# Patient Record
Sex: FEMALE | Race: WHITE | NOT HISPANIC OR LATINO
[De-identification: names, ages, dates, MRNs, and addresses within clinical notes are randomized per-mention and may not be internally consistent; named-entity substitution may affect disease eponyms.]

---

## 2017-06-12 ENCOUNTER — APPOINTMENT (OUTPATIENT)
Dept: CARDIOLOGY | Facility: CLINIC | Age: 67
End: 2017-06-12

## 2017-06-12 ENCOUNTER — NON-APPOINTMENT (OUTPATIENT)
Age: 67
End: 2017-06-12

## 2017-06-12 VITALS
RESPIRATION RATE: 14 BRPM | DIASTOLIC BLOOD PRESSURE: 84 MMHG | BODY MASS INDEX: 25.75 KG/M2 | SYSTOLIC BLOOD PRESSURE: 142 MMHG | WEIGHT: 150 LBS | OXYGEN SATURATION: 97 % | HEART RATE: 70 BPM

## 2017-06-12 RX ORDER — MODAFINIL 100 MG/1
100 TABLET ORAL DAILY
Qty: 90 | Refills: 0 | Status: ACTIVE | COMMUNITY
Start: 2017-06-12

## 2017-06-12 RX ORDER — CLONAZEPAM 0.5 MG/1
0.5 TABLET ORAL
Qty: 30 | Refills: 0 | Status: ACTIVE | COMMUNITY
Start: 2017-06-12

## 2017-12-11 ENCOUNTER — APPOINTMENT (OUTPATIENT)
Dept: CARDIOLOGY | Facility: CLINIC | Age: 67
End: 2017-12-11
Payer: MEDICARE

## 2017-12-11 ENCOUNTER — NON-APPOINTMENT (OUTPATIENT)
Age: 67
End: 2017-12-11

## 2017-12-11 VITALS
SYSTOLIC BLOOD PRESSURE: 132 MMHG | WEIGHT: 156 LBS | HEART RATE: 72 BPM | BODY MASS INDEX: 26.63 KG/M2 | DIASTOLIC BLOOD PRESSURE: 76 MMHG | HEIGHT: 64 IN

## 2017-12-11 PROCEDURE — 93000 ELECTROCARDIOGRAM COMPLETE: CPT

## 2017-12-11 PROCEDURE — 99214 OFFICE O/P EST MOD 30 MIN: CPT

## 2018-02-15 ENCOUNTER — MEDICATION RENEWAL (OUTPATIENT)
Age: 68
End: 2018-02-15

## 2018-05-14 ENCOUNTER — APPOINTMENT (OUTPATIENT)
Dept: CARDIOLOGY | Facility: CLINIC | Age: 68
End: 2018-05-14
Payer: MEDICARE

## 2018-05-14 PROCEDURE — A9500: CPT

## 2018-05-14 PROCEDURE — 93015 CV STRESS TEST SUPVJ I&R: CPT

## 2018-05-14 PROCEDURE — 78452 HT MUSCLE IMAGE SPECT MULT: CPT

## 2018-06-11 ENCOUNTER — APPOINTMENT (OUTPATIENT)
Dept: CARDIOLOGY | Facility: CLINIC | Age: 68
End: 2018-06-11
Payer: MEDICARE

## 2018-06-11 VITALS
WEIGHT: 160 LBS | HEART RATE: 60 BPM | RESPIRATION RATE: 14 BRPM | BODY MASS INDEX: 27.31 KG/M2 | DIASTOLIC BLOOD PRESSURE: 78 MMHG | SYSTOLIC BLOOD PRESSURE: 140 MMHG | HEIGHT: 64 IN

## 2018-06-11 DIAGNOSIS — R73.9 HYPERGLYCEMIA, UNSPECIFIED: ICD-10-CM

## 2018-06-11 PROCEDURE — 93000 ELECTROCARDIOGRAM COMPLETE: CPT

## 2018-06-11 PROCEDURE — 99214 OFFICE O/P EST MOD 30 MIN: CPT

## 2018-12-10 ENCOUNTER — NON-APPOINTMENT (OUTPATIENT)
Age: 68
End: 2018-12-10

## 2018-12-10 ENCOUNTER — APPOINTMENT (OUTPATIENT)
Dept: CARDIOLOGY | Facility: CLINIC | Age: 68
End: 2018-12-10
Payer: MEDICARE

## 2018-12-10 VITALS
HEIGHT: 64 IN | DIASTOLIC BLOOD PRESSURE: 86 MMHG | HEART RATE: 68 BPM | BODY MASS INDEX: 27.14 KG/M2 | SYSTOLIC BLOOD PRESSURE: 125 MMHG | OXYGEN SATURATION: 97 % | WEIGHT: 159 LBS

## 2018-12-10 PROCEDURE — 93000 ELECTROCARDIOGRAM COMPLETE: CPT

## 2018-12-10 PROCEDURE — 99215 OFFICE O/P EST HI 40 MIN: CPT

## 2018-12-10 NOTE — REASON FOR VISIT
[FreeTextEntry1] : Lorrie Devlin returns today for a followup visit regarding coronary artery disease S/P CABG, history of inferior wall myocardial infarction, hyperlipidemia and RBBB.

## 2018-12-10 NOTE — DISCUSSION/SUMMARY
[FreeTextEntry1] : ASCAD S/P CABG, s/p inferior wall myocardial infarction; remains stable. \par \par Hyperlipidemia.  Reviewed blood work, which shows good control of lipid profile.\par \par HgbA1c in diabetic range.  Advised her to avoid sweets and to try to lose weight.  Gave her a copy of her blood work to also discuss with with Dr. Zapata.   She will continue on her customary low fat diet.\par \par Explained that, although Hgb level is normal, MCV is low; gave her Rx to check ferritin and Fe/TIBC.\par \par RBBB - unchanged. \par \par Sent in new prescriptions.\par \par She will be seeing a new GI MD, as Dr. Ward is now in [place].  I asked her to let me know how this goes; if necessary, could give her other names for GI care.\par \par She will return for a visit in 6 months.  Gave her Rx for lab to recheck blood prior to that O.V.

## 2018-12-10 NOTE — ASSESSMENT
[FreeTextEntry1] : ASCAD\par      S/P CABG on October 10, 2003 by Dr. Ogden, with LIMA to OM, MARY to LAD and SVG to R-PDA\par      Inferior wall myocardial infarction in 1989\par \par Fatigue, cause unclear. Holding statin and reducing beta blocker had no benefit.\par \par Hyperlipidemia\par \par Very mild mitral and tricuspid valve insufficiency.\par \par Gastroesophageal reflux disease

## 2018-12-10 NOTE — HISTORY OF PRESENT ILLNESS
[FreeTextEntry1] : I saw her last in June.  Since that time, she continues her activities without cardiac sxs.  She describes no episodes of exertional no exertional chest discomfort or dyspnea. She reports no palpitations or episodes of lightheadedness. She reports no orthopnea or PND.\par \par She still suffers from a sense of increased fatigue, and currently uses generic Provigil on a prn basis.  Despite this, she remains active.

## 2019-01-14 ENCOUNTER — MEDICATION RENEWAL (OUTPATIENT)
Age: 69
End: 2019-01-14

## 2019-02-04 ENCOUNTER — APPOINTMENT (OUTPATIENT)
Dept: CARDIOLOGY | Facility: CLINIC | Age: 69
End: 2019-02-04
Payer: MEDICARE

## 2019-02-04 ENCOUNTER — NON-APPOINTMENT (OUTPATIENT)
Age: 69
End: 2019-02-04

## 2019-02-04 VITALS
DIASTOLIC BLOOD PRESSURE: 76 MMHG | WEIGHT: 159 LBS | BODY MASS INDEX: 27.14 KG/M2 | HEIGHT: 64 IN | OXYGEN SATURATION: 95 % | HEART RATE: 67 BPM | SYSTOLIC BLOOD PRESSURE: 116 MMHG

## 2019-02-04 DIAGNOSIS — Z01.810 ENCOUNTER FOR PREPROCEDURAL CARDIOVASCULAR EXAMINATION: ICD-10-CM

## 2019-02-04 PROCEDURE — 99215 OFFICE O/P EST HI 40 MIN: CPT

## 2019-02-04 PROCEDURE — 93000 ELECTROCARDIOGRAM COMPLETE: CPT

## 2019-02-04 NOTE — REASON FOR VISIT
[FreeTextEntry1] : Lorrie Devlin returns today for evaluation prior to EGD scheduled for later this month.  She has hx. of CAD S/P CABG, hx. of inferior wall myocardial infarction, hyperlipidemia and RBBB.

## 2019-02-04 NOTE — HISTORY OF PRESENT ILLNESS
[FreeTextEntry1] : I saw her last in December.  Since that time, she continues her usual activities and remains free of cardiac sxs.  There have been no episodes of exertional no exertional chest discomfort or dyspnea. She reports no palpitations or episodes of lightheadedness. She reports no orthopnea or PND.\par \par She is scheduled for EGD later this month.

## 2019-02-04 NOTE — ASSESSMENT
[FreeTextEntry1] : ASCAD\par      S/P CABG on October 10, 2003 by Dr. Ogden, with LIMA to OM, MARY to LAD and SVG to R-PDA\par      Inferior wall myocardial infarction in 1989\par \par Hyperlipidemia\par \par Very mild mitral and tricuspid valve insufficiency.\par \par Gastroesophageal reflux disease\par \par \par Fatigue, cause unclear. Holding statin and reducing beta blocker had no benefit.

## 2019-02-04 NOTE — DISCUSSION/SUMMARY
[FreeTextEntry1] : No cardiac contraindication to planned EGD.  Will send copy of today's note and EKG to Dr. Fran Burgos as well as copy of stress test from May 2018.\par \par ASCAD S/P CABG, s/p inferior wall myocardial infarction; remains stable.  Will continue current meds.\par \par Hyperlipidemia.  Continue Lipitor and Lovaza. \par \par RBBB - unchanged. \par \par HgbA1c in diabetic range.  Continue f/u with Dr. Zapata. \par \par She will return for a visit in the spring.

## 2019-06-10 ENCOUNTER — NON-APPOINTMENT (OUTPATIENT)
Age: 69
End: 2019-06-10

## 2019-06-10 ENCOUNTER — APPOINTMENT (OUTPATIENT)
Dept: CARDIOLOGY | Facility: CLINIC | Age: 69
End: 2019-06-10
Payer: MEDICARE

## 2019-06-10 VITALS
HEART RATE: 59 BPM | WEIGHT: 149 LBS | BODY MASS INDEX: 25.44 KG/M2 | OXYGEN SATURATION: 95 % | SYSTOLIC BLOOD PRESSURE: 124 MMHG | HEIGHT: 64 IN | DIASTOLIC BLOOD PRESSURE: 75 MMHG

## 2019-06-10 PROCEDURE — 93306 TTE W/DOPPLER COMPLETE: CPT

## 2019-06-10 PROCEDURE — 93000 ELECTROCARDIOGRAM COMPLETE: CPT

## 2019-06-10 PROCEDURE — 99214 OFFICE O/P EST MOD 30 MIN: CPT

## 2019-06-10 RX ORDER — AZELASTINE HYDROCHLORIDE 137 UG/1
0.1 SPRAY, METERED NASAL TWICE DAILY
Qty: 1 | Refills: 0 | Status: DISCONTINUED | COMMUNITY
Start: 2017-06-12 | End: 2019-06-10

## 2019-06-10 NOTE — REASON FOR VISIT
[FreeTextEntry1] : \par Lorrie Devlin returns today for f/u regarding CAD S/P CABG, hx. of inferior wall myocardial infarction, hyperlipidemia and RBBB.

## 2019-06-10 NOTE — DISCUSSION/SUMMARY
[FreeTextEntry1] : \par ASCAD S/P CABG, s/p inferior wall myocardial infarction; remains stable.  Will continue current meds.\par \par Hyperlipidemia.  Continue Lipitor and Lovaza. \par \par RBBB - unchanged. \par \par Review echo results with her; findings are stable.\par \par HgbA1c in diabetic range.  Continue f/u with Dr. Zapata. \par \par Has Rx to recheck labs, which she will do in next week or two\par \par She will return for a visit in six months.

## 2019-06-10 NOTE — PHYSICAL EXAM
[General Appearance - Well Developed] : well developed [General Appearance - Well Nourished] : well nourished [Normal Appearance] : normal appearance [Well Groomed] : well groomed [Normal Conjunctiva] : the conjunctiva exhibited no abnormalities [General Appearance - In No Acute Distress] : no acute distress [Eyelids - No Xanthelasma] : the eyelids demonstrated no xanthelasmas [Normal Jugular Venous A Waves Present] : normal jugular venous A waves present [Normal Jugular Venous V Waves Present] : normal jugular venous V waves present [Respiration, Rhythm And Depth] : normal respiratory rhythm and effort [Auscultation Breath Sounds / Voice Sounds] : lungs were clear to auscultation bilaterally [Heart Rate And Rhythm] : heart rate and rhythm were normal [Abnormal Walk] : normal gait [Bowel Sounds] : normal bowel sounds [Nail Clubbing] : no clubbing of the fingernails [Cyanosis, Localized] : no localized cyanosis [] : no rash [Skin Color & Pigmentation] : normal skin color and pigmentation [Impaired Insight] : insight and judgment were intact [Affect] : the affect was normal [Oriented To Time, Place, And Person] : oriented to person, place, and time [Mood] : the mood was normal [FreeTextEntry1] : 2+ pulses in the upper and lower extremities. No edema.

## 2019-06-10 NOTE — HISTORY OF PRESENT ILLNESS
[FreeTextEntry1] : I saw her last in February.  Since that time, she has been stable from a cardiac standpoint.  She reports  cardiac sxs. - there have been no episodes of exertional no exertional chest discomfort or dyspnea. She reports no palpitations or episodes of lightheadedness. She reports no orthopnea or PND.\par \par She is seeing a new GI MD affiliated with [facility], Dr. Burgos.  She had a recent EGD due to flare up of upper GI sxs., and remains on Prilosec.

## 2019-10-09 ENCOUNTER — RX RENEWAL (OUTPATIENT)
Age: 69
End: 2019-10-09

## 2019-12-10 ENCOUNTER — APPOINTMENT (OUTPATIENT)
Dept: CARDIOLOGY | Facility: CLINIC | Age: 69
End: 2019-12-10
Payer: MEDICARE

## 2019-12-10 ENCOUNTER — NON-APPOINTMENT (OUTPATIENT)
Age: 69
End: 2019-12-10

## 2019-12-10 VITALS
DIASTOLIC BLOOD PRESSURE: 81 MMHG | TEMPERATURE: 98.1 F | BODY MASS INDEX: 26.98 KG/M2 | OXYGEN SATURATION: 95 % | HEART RATE: 71 BPM | WEIGHT: 158 LBS | RESPIRATION RATE: 17 BRPM | SYSTOLIC BLOOD PRESSURE: 133 MMHG | HEIGHT: 64 IN

## 2019-12-10 PROCEDURE — 99214 OFFICE O/P EST MOD 30 MIN: CPT

## 2019-12-10 PROCEDURE — 93000 ELECTROCARDIOGRAM COMPLETE: CPT

## 2019-12-10 NOTE — HISTORY OF PRESENT ILLNESS
[FreeTextEntry1] : I saw her last in June.  Since that time, she has been well.  She remains free of cardiac sxs.; she reports no episodes of exertional no exertional chest discomfort or dyspnea. She reports no palpitations or episodes of lightheadedness. She reports no orthopnea or PND.\par \par She reports no new interval medical problems.  She continues to follow-up with Dr. Zapata for primary care as well as Dr. Burgos, a gastroenterologist is affiliated with [facility].

## 2019-12-10 NOTE — DISCUSSION/SUMMARY
[FreeTextEntry1] : \par ASCAD S/P CABG, s/p inferior wall myocardial infarction; remains stable.  Will continue current meds.\par \par Hyperlipidemia - Continue Lipitor and Lovaza; lipid profile remains in good range.\par \par RBBB - unchanged. \par \par HgbA1c in pre-diabetic range; encouraged her to be more careful with her diet and to try to lose weight. \par \par Provided Rx to recheck labs prior to next O.V.\par \par She will return for a visit in six months.

## 2019-12-10 NOTE — PHYSICAL EXAM
[General Appearance - Well Developed] : well developed [Normal Appearance] : normal appearance [Well Groomed] : well groomed [General Appearance - Well Nourished] : well nourished [Normal Conjunctiva] : the conjunctiva exhibited no abnormalities [General Appearance - In No Acute Distress] : no acute distress [Eyelids - No Xanthelasma] : the eyelids demonstrated no xanthelasmas [Normal Jugular Venous A Waves Present] : normal jugular venous A waves present [Normal Jugular Venous V Waves Present] : normal jugular venous V waves present [Respiration, Rhythm And Depth] : normal respiratory rhythm and effort [Auscultation Breath Sounds / Voice Sounds] : lungs were clear to auscultation bilaterally [Heart Rate And Rhythm] : heart rate and rhythm were normal [Abnormal Walk] : normal gait [Bowel Sounds] : normal bowel sounds [Cyanosis, Localized] : no localized cyanosis [Nail Clubbing] : no clubbing of the fingernails [] : no rash [Skin Color & Pigmentation] : normal skin color and pigmentation [Affect] : the affect was normal [Impaired Insight] : insight and judgment were intact [Oriented To Time, Place, And Person] : oriented to person, place, and time [Mood] : the mood was normal [FreeTextEntry1] : 2+ pulses in the upper and lower extremities. No edema.

## 2020-02-17 ENCOUNTER — RX RENEWAL (OUTPATIENT)
Age: 70
End: 2020-02-17

## 2020-10-28 ENCOUNTER — RX RENEWAL (OUTPATIENT)
Age: 70
End: 2020-10-28

## 2020-11-09 ENCOUNTER — NON-APPOINTMENT (OUTPATIENT)
Age: 70
End: 2020-11-09

## 2020-11-09 ENCOUNTER — APPOINTMENT (OUTPATIENT)
Dept: CARDIOLOGY | Facility: CLINIC | Age: 70
End: 2020-11-09
Payer: MEDICARE

## 2020-11-09 ENCOUNTER — RX RENEWAL (OUTPATIENT)
Age: 70
End: 2020-11-09

## 2020-11-09 VITALS
TEMPERATURE: 96.7 F | WEIGHT: 165 LBS | RESPIRATION RATE: 14 BRPM | SYSTOLIC BLOOD PRESSURE: 152 MMHG | BODY MASS INDEX: 28.32 KG/M2 | HEART RATE: 71 BPM | DIASTOLIC BLOOD PRESSURE: 94 MMHG | OXYGEN SATURATION: 96 %

## 2020-11-09 PROCEDURE — 93000 ELECTROCARDIOGRAM COMPLETE: CPT

## 2020-11-09 PROCEDURE — 99214 OFFICE O/P EST MOD 30 MIN: CPT

## 2020-11-09 RX ORDER — MELOXICAM 15 MG/1
15 TABLET ORAL
Qty: 30 | Refills: 0 | Status: COMPLETED | COMMUNITY
Start: 2020-07-10

## 2020-11-09 RX ORDER — DICLOFENAC SODIUM 1% 10 MG/G
1 GEL TOPICAL
Qty: 100 | Refills: 0 | Status: COMPLETED | COMMUNITY
Start: 2020-07-10

## 2020-11-09 RX ORDER — CICLOPIROX 80 MG/ML
8 SOLUTION TOPICAL
Qty: 20 | Refills: 0 | Status: COMPLETED | COMMUNITY
Start: 2020-08-03

## 2020-11-09 NOTE — HISTORY OF PRESENT ILLNESS
[FreeTextEntry1] : I saw her last in December.  Since that time, she remains well from a cardiac standpoint.  She continues her usual activities, working in her yard, including mowing the lawn each week.  She recounts no episodes of exertional no exertional chest discomfort or dyspnea. She reports no palpitations or episodes of lightheadedness. 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  She tells me that when she when she stands in place, she has a tremor, which resolves with motion or activity.  She anticipates that she may be started on a medication to see if it helps her symptoms.\par \par She continues to follow-up with Dr. Zapaat for primary care as well as Dr. Burgos, a gastroenterologist is affiliated with [place].  She tells me the dose of omeprazole was reduced to 20 mg each day, and she is using Tums on a as needed basis for reflux symptoms.

## 2020-11-09 NOTE — DISCUSSION/SUMMARY
[FreeTextEntry1] : \par ASCAD S/P CABG, s/p inferior wall myocardial infarction; remains active and free of sxs.  She will continue current meds.\par \par Hyperlipidemia - Continue Lipitor and Lovaza; lipid profile remains in good range.\par \par RBBB - unchanged. \par \par HgbA1c creeping upward.  I advised her to avoid concentrated sweets and reminded here that weight loss would have a favorable effect on her blood sugar. \par \par Provided Rx to recheck labs prior to next O.V.\par \par She will return for a visit in six months.

## 2021-05-10 ENCOUNTER — APPOINTMENT (OUTPATIENT)
Dept: CARDIOLOGY | Facility: CLINIC | Age: 71
End: 2021-05-10
Payer: MEDICARE

## 2021-05-10 ENCOUNTER — NON-APPOINTMENT (OUTPATIENT)
Age: 71
End: 2021-05-10

## 2021-05-10 VITALS
HEIGHT: 64 IN | HEART RATE: 66 BPM | BODY MASS INDEX: 25.27 KG/M2 | WEIGHT: 148 LBS | DIASTOLIC BLOOD PRESSURE: 74 MMHG | OXYGEN SATURATION: 95 % | SYSTOLIC BLOOD PRESSURE: 122 MMHG

## 2021-05-10 VITALS — SYSTOLIC BLOOD PRESSURE: 114 MMHG | RESPIRATION RATE: 14 BRPM | DIASTOLIC BLOOD PRESSURE: 68 MMHG

## 2021-05-10 DIAGNOSIS — I34.0 NONRHEUMATIC MITRAL (VALVE) INSUFFICIENCY: ICD-10-CM

## 2021-05-10 PROCEDURE — 99214 OFFICE O/P EST MOD 30 MIN: CPT

## 2021-05-10 PROCEDURE — 99072 ADDL SUPL MATRL&STAF TM PHE: CPT

## 2021-05-10 PROCEDURE — 93000 ELECTROCARDIOGRAM COMPLETE: CPT

## 2021-05-10 NOTE — DISCUSSION/SUMMARY
[FreeTextEntry1] : \par ASCAD S/P CABG, s/p inferior wall myocardial infarction.  Mrs. Devlin remains active and free of sxs.; she will continue current meds.\par \par HLD - Continue Lipitor and Lovaza; recent lab work shows lipid profile is in good range.\par \par RBBB - unchanged. \par \par HgbA1c improved, now doubt in response to weight loss.  She will continue to lose weight and avoid concentrated sweets. \par \par Will recheck labs prior to next O.V.  I provided a copy of the current labs to bring to Dr. Zapata.\par \par She will return for a visit in six months.

## 2021-05-10 NOTE — HISTORY OF PRESENT ILLNESS
[FreeTextEntry1] : I saw her last in November.  Since that time, she has been well from a cardiac standpoint.  She continues her usual activities, and now that spring has returned, she has been busy working in her yard.  She reports no episodes of exertional no exertional chest discomfort or GANDHI. There have been no palpitations or episodes of lightheadedness. She describes no orthopnea or PND.\par \par There have been no new interval medical problems. Medications are unchanged. She has received both Covid 19 vaccination in March (Pfizer).

## 2021-05-10 NOTE — ASSESSMENT
[FreeTextEntry1] : ASCAD\par      S/P CABG on October 10, 2003 by Dr. Ogden, with LIMA to OM, MARY to LAD and SVG to R-PDA\par      Inferior wall myocardial infarction in 1989\par \par Hyperlipidemia\par \par Very mild mitral and tricuspid valve insufficiency.\par \par Gastroesophageal reflux disease\par \par Fatigue, cause unclear. Holding statin and reducing beta blocker had no benefit.

## 2021-10-10 ENCOUNTER — RESULT CHARGE (OUTPATIENT)
Age: 71
End: 2021-10-10

## 2021-10-12 ENCOUNTER — APPOINTMENT (OUTPATIENT)
Dept: CARDIOLOGY | Facility: CLINIC | Age: 71
End: 2021-10-12

## 2021-10-13 NOTE — REASON FOR VISIT
[FreeTextEntry1] : NO SHOW FOR APPOINTMENT TODAY. \par \par \par PRELIMINARY NOTE\par \par \par Lorrie Hoamara returns today for f/u regarding CAD S/P CABG, hx. of inferior wall myocardial infarction, hyperlipidemia and RBBB.

## 2021-10-13 NOTE — HISTORY OF PRESENT ILLNESS
[FreeTextEntry1] : Since I saw her last,\par \par \par \par she continues her usual activities, and now that spring has returned, she has been busy working in her yard.  She reports no episodes of exertional no exertional chest discomfort or GANDHI. There have been no palpitations or episodes of lightheadedness. She describes no orthopnea or PND.\par \par There have been no new interval medical problems. Medications are unchanged. She has received both Covid 19 vaccination in March (Pfizer).

## 2021-10-20 ENCOUNTER — NON-APPOINTMENT (OUTPATIENT)
Age: 71
End: 2021-10-20

## 2021-10-20 ENCOUNTER — APPOINTMENT (OUTPATIENT)
Dept: CARDIOLOGY | Facility: CLINIC | Age: 71
End: 2021-10-20
Payer: MEDICARE

## 2021-10-20 VITALS
OXYGEN SATURATION: 98 % | DIASTOLIC BLOOD PRESSURE: 80 MMHG | WEIGHT: 154 LBS | BODY MASS INDEX: 26.29 KG/M2 | HEIGHT: 64 IN | RESPIRATION RATE: 15 BRPM | SYSTOLIC BLOOD PRESSURE: 138 MMHG | HEART RATE: 61 BPM

## 2021-10-20 PROCEDURE — 99215 OFFICE O/P EST HI 40 MIN: CPT

## 2021-10-20 PROCEDURE — 93000 ELECTROCARDIOGRAM COMPLETE: CPT

## 2021-10-20 RX ORDER — PERAMPANEL 8 MG/1
TABLET ORAL
Refills: 0 | Status: ACTIVE | COMMUNITY

## 2021-10-20 NOTE — HISTORY OF PRESENT ILLNESS
[FreeTextEntry1] : Since I saw her last, she continues her usual activities and remained well. She reports no cardiac sxs. - no  episodes of exertional no exertional chest discomfort or GANDHI. She reports  have been no palpitations or episodes of lightheadedness. She describes no orthopnea or PND.\par \par There reports no new interval medical problems other than the development of cataracts in both eyes with impairment of vision. \par \par Fycompa has been added to her meds to help with a lower extremity tremor, and she reports improvement after starting it.

## 2021-10-20 NOTE — DISCUSSION/SUMMARY
[FreeTextEntry1] : \par No cardiac contraindication to planned cataract surgery.\par \par ASCAD S/P CABG, s/p inferior wall myocardial infarction.  Mrs. Devlin remains active and free of sxs.; she will continue current meds.\par \par HLD - Continue Lipitor and Lovaza; most recent lab work showed lipid profile is in good range.  Provided lab requistion to repeat labs at her convenience in next few weeks.\par \par RBBB - unchanged. \par \par Mildly elevated HgbA1c in past - continue to avoid concentrated sweets, remain attentive to weight.. \par \par Will fax pertinent records to eye surgery center.\par \par 40 minutes spent on today's office visit.\par \par She will return for a visit in six months.

## 2021-10-20 NOTE — ASSESSMENT
[FreeTextEntry1] : ASCAD\par      S/P CABG on October 10, 2003 by Dr. Ogden, with LIMA to OM, MARY to LAD and SVG to R-PDA\par      Inferior wall myocardial infarction in 1989\par \par Hyperlipidemia\par \par Very mild mitral and tricuspid valve insufficiency.\par \par Gastroesophageal reflux disease\par \par Cataracts\par \par Fatigue, cause unclear. Holding statin and reducing beta blocker had no benefit.

## 2021-10-20 NOTE — REASON FOR VISIT
[FreeTextEntry1] : \par Gwendolyn Devlin returns today for f/u regarding CAD S/P CABG, hx. of inferior wall myocardial infarction, hyperlipidemia and RBBB.  She is scheduled for cataract surgery on Mon., Oct. 25.

## 2021-11-30 ENCOUNTER — NON-APPOINTMENT (OUTPATIENT)
Age: 71
End: 2021-11-30

## 2022-04-27 ENCOUNTER — NON-APPOINTMENT (OUTPATIENT)
Age: 72
End: 2022-04-27

## 2022-04-27 ENCOUNTER — APPOINTMENT (OUTPATIENT)
Dept: CARDIOLOGY | Facility: CLINIC | Age: 72
End: 2022-04-27
Payer: MEDICARE

## 2022-04-27 VITALS
RESPIRATION RATE: 15 BRPM | SYSTOLIC BLOOD PRESSURE: 132 MMHG | WEIGHT: 158 LBS | DIASTOLIC BLOOD PRESSURE: 80 MMHG | HEIGHT: 64 IN | HEART RATE: 71 BPM | BODY MASS INDEX: 26.98 KG/M2 | OXYGEN SATURATION: 96 %

## 2022-04-27 PROCEDURE — 93000 ELECTROCARDIOGRAM COMPLETE: CPT

## 2022-04-27 PROCEDURE — 99215 OFFICE O/P EST HI 40 MIN: CPT

## 2022-04-27 NOTE — REASON FOR VISIT
[FreeTextEntry1] : \par Gwendolyn Claus returns today for f/u regarding CAD S/P CABG, hx. of inferior wall myocardial infarction, hyperlipidemia and RBBB.

## 2022-04-27 NOTE — HISTORY OF PRESENT ILLNESS
[FreeTextEntry1] : Since I saw her last, she reports an increased sense of tiredness, most noticeable with exertion.  She does not describe GANDHI specifically, but clearly she needs to stop to rest before continuing.  She does  not describe CP.  She reports no palpitations or episodes of lightheadedness. There have been no episodes of orthopnea or PND.\par \par Medications are unchanged.

## 2022-04-27 NOTE — ASSESSMENT
[FreeTextEntry1] : ASCAD\par      S/P CABG on October 10, 2003 by Dr. Ogden, with LIMA to OM, MARY to LAD and SVG to R-PDA\par      Inferior wall myocardial infarction in 1989\par \par Hyperlipidemia\par \par RBBB\par \par Very mild mitral and tricuspid valve insufficiency.\par \par Gastroesophageal reflux disease\par \par Fatigue, cause unclear. Holding statin and reducing beta blocker had no benefit.

## 2022-04-27 NOTE — DISCUSSION/SUMMARY
[FreeTextEntry1] : ASCAD S/P CABG, s/p inferior wall myocardial infarction.  I am concerned that her current sense of fatigue, which is exertional and limits her activities, might be an anginal equivalent.  Her symptoms in the past had not been typical.  While the most recent stress test was unremarkable, prior to her bypass surgery, stress testing was not reliable and only angiography proved diagnostic.  As it has been 19 years since her CABG, I think it best to arrange for diagnostic angiogram, to reassess perfusion through her coronary arteries.  She is agreeable to this.  \par I will call the cath booking office to schedule this and will speak to Dr. Gerardo in advance of the procedure.\par \par HLD -She will continue Lipitor and Lovaza at the current dosage.  Recent lab work confirms that her lipid profile remains in good range.  I will provide a lab requisition that she can recheck her blood work prior to her next visit. \par \par RBBB - unchanged. \par \par Mildly elevated HgbA1c in past -reminded to avoid concentrated sweets, remain attentive to weight.. \par \par 40 minutes spent on today's office visit.  \par \par If angiography is unremarkable, she will return for a visit in six months.

## 2022-05-03 ENCOUNTER — TRANSCRIPTION ENCOUNTER (OUTPATIENT)
Age: 72
End: 2022-05-03

## 2022-05-03 ENCOUNTER — OUTPATIENT (OUTPATIENT)
Dept: OUTPATIENT SERVICES | Facility: HOSPITAL | Age: 72
LOS: 1 days | End: 2022-05-03
Payer: MEDICARE

## 2022-05-03 VITALS
OXYGEN SATURATION: 95 % | HEART RATE: 65 BPM | RESPIRATION RATE: 11 BRPM | SYSTOLIC BLOOD PRESSURE: 148 MMHG | DIASTOLIC BLOOD PRESSURE: 85 MMHG

## 2022-05-03 VITALS
OXYGEN SATURATION: 98 % | HEIGHT: 64 IN | SYSTOLIC BLOOD PRESSURE: 172 MMHG | TEMPERATURE: 98 F | DIASTOLIC BLOOD PRESSURE: 87 MMHG | HEART RATE: 74 BPM | WEIGHT: 152.12 LBS | RESPIRATION RATE: 15 BRPM

## 2022-05-03 DIAGNOSIS — Z98.890 OTHER SPECIFIED POSTPROCEDURAL STATES: Chronic | ICD-10-CM

## 2022-05-03 DIAGNOSIS — Z95.1 PRESENCE OF AORTOCORONARY BYPASS GRAFT: Chronic | ICD-10-CM

## 2022-05-03 DIAGNOSIS — I25.10 ATHEROSCLEROTIC HEART DISEASE OF NATIVE CORONARY ARTERY WITHOUT ANGINA PECTORIS: ICD-10-CM

## 2022-05-03 DIAGNOSIS — Z95.1 PRESENCE OF AORTOCORONARY BYPASS GRAFT: ICD-10-CM

## 2022-05-03 LAB
ANION GAP SERPL CALC-SCNC: 12 MMOL/L — SIGNIFICANT CHANGE UP (ref 5–17)
BUN SERPL-MCNC: 13 MG/DL — SIGNIFICANT CHANGE UP (ref 7–23)
CALCIUM SERPL-MCNC: 9.1 MG/DL — SIGNIFICANT CHANGE UP (ref 8.4–10.5)
CHLORIDE SERPL-SCNC: 106 MMOL/L — SIGNIFICANT CHANGE UP (ref 96–108)
CO2 SERPL-SCNC: 23 MMOL/L — SIGNIFICANT CHANGE UP (ref 22–31)
CREAT SERPL-MCNC: 0.59 MG/DL — SIGNIFICANT CHANGE UP (ref 0.5–1.3)
EGFR: 95 ML/MIN/1.73M2 — SIGNIFICANT CHANGE UP
GLUCOSE SERPL-MCNC: 113 MG/DL — HIGH (ref 70–99)
HCT VFR BLD CALC: 41.4 % — SIGNIFICANT CHANGE UP (ref 34.5–45)
HGB BLD-MCNC: 13 G/DL — SIGNIFICANT CHANGE UP (ref 11.5–15.5)
MCHC RBC-ENTMCNC: 27.9 PG — SIGNIFICANT CHANGE UP (ref 27–34)
MCHC RBC-ENTMCNC: 31.4 GM/DL — LOW (ref 32–36)
MCV RBC AUTO: 88.8 FL — SIGNIFICANT CHANGE UP (ref 80–100)
NRBC # BLD: 0 /100 WBCS — SIGNIFICANT CHANGE UP (ref 0–0)
PLATELET # BLD AUTO: 221 K/UL — SIGNIFICANT CHANGE UP (ref 150–400)
POTASSIUM SERPL-MCNC: 4.3 MMOL/L — SIGNIFICANT CHANGE UP (ref 3.5–5.3)
POTASSIUM SERPL-SCNC: 4.3 MMOL/L — SIGNIFICANT CHANGE UP (ref 3.5–5.3)
RBC # BLD: 4.66 M/UL — SIGNIFICANT CHANGE UP (ref 3.8–5.2)
RBC # FLD: 13.4 % — SIGNIFICANT CHANGE UP (ref 10.3–14.5)
SODIUM SERPL-SCNC: 141 MMOL/L — SIGNIFICANT CHANGE UP (ref 135–145)
WBC # BLD: 5.22 K/UL — SIGNIFICANT CHANGE UP (ref 3.8–10.5)
WBC # FLD AUTO: 5.22 K/UL — SIGNIFICANT CHANGE UP (ref 3.8–10.5)

## 2022-05-03 PROCEDURE — C1887: CPT

## 2022-05-03 PROCEDURE — 93459 L HRT ART/GRFT ANGIO: CPT | Mod: 26

## 2022-05-03 PROCEDURE — C1769: CPT

## 2022-05-03 PROCEDURE — 80048 BASIC METABOLIC PNL TOTAL CA: CPT

## 2022-05-03 PROCEDURE — 93459 L HRT ART/GRFT ANGIO: CPT

## 2022-05-03 PROCEDURE — 93010 ELECTROCARDIOGRAM REPORT: CPT

## 2022-05-03 PROCEDURE — 93005 ELECTROCARDIOGRAM TRACING: CPT

## 2022-05-03 PROCEDURE — 85027 COMPLETE CBC AUTOMATED: CPT

## 2022-05-03 PROCEDURE — C1894: CPT

## 2022-05-03 RX ORDER — PERAMPANEL 2 MG/1
1 TABLET ORAL
Qty: 0 | Refills: 0 | DISCHARGE

## 2022-05-03 RX ORDER — SODIUM CHLORIDE 9 MG/ML
1000 INJECTION INTRAMUSCULAR; INTRAVENOUS; SUBCUTANEOUS
Refills: 0 | Status: DISCONTINUED | OUTPATIENT
Start: 2022-05-03 | End: 2022-05-17

## 2022-05-03 RX ORDER — FOLIC ACID 0.8 MG
0 TABLET ORAL
Qty: 0 | Refills: 0 | DISCHARGE

## 2022-05-03 RX ORDER — SODIUM CHLORIDE 9 MG/ML
250 INJECTION INTRAMUSCULAR; INTRAVENOUS; SUBCUTANEOUS ONCE
Refills: 0 | Status: COMPLETED | OUTPATIENT
Start: 2022-05-03 | End: 2022-05-03

## 2022-05-03 RX ORDER — CLONAZEPAM 1 MG
1 TABLET ORAL
Qty: 0 | Refills: 0 | DISCHARGE

## 2022-05-03 RX ORDER — SERTRALINE 25 MG/1
1 TABLET, FILM COATED ORAL
Qty: 0 | Refills: 0 | DISCHARGE

## 2022-05-03 RX ORDER — PSEUDOEPHEDRINE HCL 30 MG
0 TABLET ORAL
Qty: 0 | Refills: 0 | DISCHARGE

## 2022-05-03 RX ORDER — OMEPRAZOLE 10 MG/1
1 CAPSULE, DELAYED RELEASE ORAL
Qty: 0 | Refills: 0 | DISCHARGE

## 2022-05-03 RX ORDER — METOPROLOL TARTRATE 50 MG
1 TABLET ORAL
Qty: 0 | Refills: 0 | DISCHARGE

## 2022-05-03 RX ORDER — OMEGA-3 ACID ETHYL ESTERS 1 G
0 CAPSULE ORAL
Qty: 0 | Refills: 0 | DISCHARGE

## 2022-05-03 RX ORDER — ASPIRIN/CALCIUM CARB/MAGNESIUM 324 MG
1 TABLET ORAL
Qty: 0 | Refills: 0 | DISCHARGE

## 2022-05-03 RX ORDER — ATORVASTATIN CALCIUM 80 MG/1
1 TABLET, FILM COATED ORAL
Qty: 0 | Refills: 0 | DISCHARGE

## 2022-05-03 RX ORDER — LORATADINE 10 MG/1
0 TABLET ORAL
Qty: 0 | Refills: 0 | DISCHARGE

## 2022-05-03 RX ADMIN — SODIUM CHLORIDE 75 MILLILITER(S): 9 INJECTION INTRAMUSCULAR; INTRAVENOUS; SUBCUTANEOUS at 12:08

## 2022-05-03 RX ADMIN — SODIUM CHLORIDE 500 MILLILITER(S): 9 INJECTION INTRAMUSCULAR; INTRAVENOUS; SUBCUTANEOUS at 12:08

## 2022-05-03 NOTE — ASU PATIENT PROFILE, ADULT - FALL HARM RISK - UNIVERSAL INTERVENTIONS
Bed in lowest position, wheels locked, appropriate side rails in place/Call bell, personal items and telephone in reach/Instruct patient to call for assistance before getting out of bed or chair/Non-slip footwear when patient is out of bed/[place] to call system/Physically safe environment - no spills, clutter or unnecessary equipment/Purposeful Proactive Rounding/Room/bathroom lighting operational, light cord in reach

## 2022-05-03 NOTE — H&P CARDIOLOGY - NSICDXFAMILYHX_GEN_ALL_CORE_FT
FAMILY HISTORY:  Father  Still living? Unknown  FH: heart disease, Age at diagnosis: Age Unknown    Mother  Still living? Unknown  FH: heart disease, Age at diagnosis: Age Unknown    Sibling  Still living? Unknown  FH: heart disease, Age at diagnosis: Age Unknown    Grandparent  Still living? Unknown  FH: heart disease, Age at diagnosis: Age Unknown    Aunt  Still living? Unknown  FH: heart disease, Age at diagnosis: Age Unknown    Uncle  Still living? Unknown  FH: heart disease, Age at diagnosis: Age Unknown

## 2022-05-03 NOTE — H&P CARDIOLOGY - HISTORY OF PRESENT ILLNESS
72 year old female with significant PMHx of CAD S/P CABG October 10, 2003 by Dr. Ogden, with LIMA to OM, MARY to LAD and SVG to R-PDA, Inferior wall myocardial infarction in 1989, hyperlipidemia and RBBB presents for [facility].  Patient reports an increased sense of tiredness, most noticeable with exertion and states that she needs to stop to rest before continuing, presumed anginal equivalent. Seen and evaluated by Dr. Santillan. Recent stress test was unremarkable, prior to her bypass surgery, stress testing was not reliable and only angiography proved diagnostic. Referred to Dr. Gerardo for further ischemic evaluation.     Patient denies palpitations or episodes of lightheadedness, orthopnea or PND. 72 year old female (denies cardiac implanted devices) with significant PMHx of CAD S/P CABG October 10, 2003 by Dr. Ogden, with LIMA to OM, MARY to LAD and SVG to R-PDA, Inferior wall myocardial infarction in 1989, hyperlipidemia, strong CAD family history and RBBB presents for [facility].  Patient reports an increased sense of tiredness, most noticeable with exertion and states that she needs to stop to rest before continuing, presumed anginal equivalent. Seen and evaluated by Dr. Santillan. Stress test was unremarkable, prior to her bypass surgery, stress testing was not reliable and only angiography proved diagnostic. Referred to Dr. Gerardo for further ischemic evaluation.     Patient denies palpitations or episodes of lightheadedness, orthopnea or PND.

## 2022-05-03 NOTE — H&P CARDIOLOGY - NSICDXPASTSURGICALHX_GEN_ALL_CORE_FT
PAST SURGICAL HISTORY:  S/P bunionectomy 1996    S/P CABG x 3 October 10, 2003 by Dr. Ogden, with LIMA to OM, MARY to LAD and SVG to R-PDA    S/P carpal tunnel release

## 2022-05-03 NOTE — ASU DISCHARGE PLAN (ADULT/PEDIATRIC) - NS MD DC FALL RISK RISK
For information on Fall & Injury Prevention, visit: [email or web address] OR  [email or web address] OR  https://www.cdc.gov/steadi/patient.html

## 2022-05-03 NOTE — H&P CARDIOLOGY - NSICDXPASTMEDICALHX_GEN_ALL_CORE_FT
PAST MEDICAL HISTORY:  Acute myocardial infarction     CAD (coronary artery disease)     HLD (hyperlipidemia)     RBBB      PAST MEDICAL HISTORY:  Acute myocardial infarction 1989    Arthritis     CAD (coronary artery disease)     HLD (hyperlipidemia)     Occasional tremors     RBBB

## 2022-05-03 NOTE — ASU DISCHARGE PLAN (ADULT/PEDIATRIC) - CARE PROVIDER_API CALL
Diallo Santillan (MD)  Cardiovascular Disease; Internal Medicine  [address] [facility] [address] [facility], [facility] [address]  [place] [address]  Phone: [phone]  Fax: [phone]  Follow Up Time: 2 weeks

## 2022-07-25 NOTE — ASSESSMENT
Last OV: 03/03/2022  Next OV: x [FreeTextEntry1] : ASCAD\par      S/P CABG on October 10, 2003 by Dr. Ogden, with LIMA to OM, MARY to LAD and SVG to R-PDA\par      Inferior wall myocardial infarction in 1989\par \par Hyperlipidemia\par \par Very mild mitral and tricuspid valve insufficiency.\par \par Gastroesophageal reflux disease\par \par \par Fatigue, cause unclear. Holding statin and reducing beta blocker had no benefit.

## 2022-10-25 ENCOUNTER — APPOINTMENT (OUTPATIENT)
Dept: CARDIOLOGY | Facility: CLINIC | Age: 72
End: 2022-10-25

## 2022-10-25 ENCOUNTER — NON-APPOINTMENT (OUTPATIENT)
Age: 72
End: 2022-10-25

## 2022-10-25 VITALS
SYSTOLIC BLOOD PRESSURE: 142 MMHG | BODY MASS INDEX: 27.31 KG/M2 | WEIGHT: 160 LBS | DIASTOLIC BLOOD PRESSURE: 84 MMHG | HEIGHT: 64 IN | HEART RATE: 71 BPM | OXYGEN SATURATION: 99 % | RESPIRATION RATE: 15 BRPM

## 2022-10-25 PROBLEM — M19.90 UNSPECIFIED OSTEOARTHRITIS, UNSPECIFIED SITE: Chronic | Status: ACTIVE | Noted: 2022-05-03

## 2022-10-25 PROBLEM — E78.5 HYPERLIPIDEMIA, UNSPECIFIED: Chronic | Status: ACTIVE | Noted: 2022-05-03

## 2022-10-25 PROBLEM — I45.10 UNSPECIFIED RIGHT BUNDLE-BRANCH BLOCK: Chronic | Status: ACTIVE | Noted: 2022-05-03

## 2022-10-25 PROBLEM — R25.1 TREMOR, UNSPECIFIED: Chronic | Status: ACTIVE | Noted: 2022-05-03

## 2022-10-25 PROBLEM — I21.9 ACUTE MYOCARDIAL INFARCTION, UNSPECIFIED: Chronic | Status: ACTIVE | Noted: 2022-05-03

## 2022-10-25 PROBLEM — I25.10 ATHEROSCLEROTIC HEART DISEASE OF NATIVE CORONARY ARTERY WITHOUT ANGINA PECTORIS: Chronic | Status: ACTIVE | Noted: 2022-05-03

## 2022-10-25 PROCEDURE — 93000 ELECTROCARDIOGRAM COMPLETE: CPT

## 2022-10-25 PROCEDURE — 99214 OFFICE O/P EST MOD 30 MIN: CPT | Mod: 25

## 2022-10-25 RX ORDER — PREDNISOLONE ACETATE 10 MG/ML
1 SUSPENSION/ DROPS OPHTHALMIC
Qty: 5 | Refills: 0 | Status: COMPLETED | COMMUNITY
Start: 2022-08-31

## 2022-10-25 RX ORDER — OMEPRAZOLE 20 MG/1
20 CAPSULE, DELAYED RELEASE ORAL
Qty: 30 | Refills: 0 | Status: ACTIVE | COMMUNITY
Start: 2022-09-29

## 2022-10-25 NOTE — HISTORY OF PRESENT ILLNESS
[FreeTextEntry1] : Since I saw her last, she remains stable.  The degree of fatigue is unchanged.  She does not describe any recent cardiac symptoms; there is no exertional chest discomfort, no significant dyspnea.  She describes no palpitations or episodes of lightheadedness. There have been no episodes of orthopnea or PND.\par \par Medications are unchanged. \par \par A cardiac catheterization was performed last May after a stress test abnormality.  The study demonstrated a patent LIMA graft to the obtuse marginal branch occlusion of the saphenous vein graft to the RCA and occlusion of the LIMA to the LAD, although the native LAD has only mild CAD.  The RCA was seen to be well supplied via collaterals.  Ongoing medical therapy was advised.\par

## 2022-10-25 NOTE — ASSESSMENT
[FreeTextEntry1] : ASCAD\par      S/P CABG on October 10, 2003 by Dr. Ogden, with LIMA to OM, MARY to LAD and SVG to R-PDA\par      Inferior wall myocardial infarction in 1989\par      Cardiac cath 5/3/22 -\par           Patent LIMA graft to the obtuse marginal branch\par           Occlusion of SVG to the RCA; RCA well supplied by collaterals.  \par           Occlusion of the LIMA to the LAD, although the native LAD has only mild CAD.  \par \par Hyperlipidemia\par \par RBBB\par \par Very mild mitral and tricuspid valve insufficiency.\par \par Gastroesophageal reflux disease\par \par Fatigue, cause unclear. Holding statin and reducing beta blocker had no benefit.

## 2022-10-25 NOTE — PHYSICAL EXAM
[General Appearance - Well Developed] : well developed [Normal Appearance] : normal appearance [Well Groomed] : well groomed [General Appearance - Well Nourished] : well nourished [General Appearance - In No Acute Distress] : no acute distress [Normal Conjunctiva] : the conjunctiva exhibited no abnormalities [Eyelids - No Xanthelasma] : the eyelids demonstrated no xanthelasmas [Normal Jugular Venous A Waves Present] : normal jugular venous A waves present [Normal Jugular Venous V Waves Present] : normal jugular venous V waves present [Respiration, Rhythm And Depth] : normal respiratory rhythm and effort [Auscultation Breath Sounds / Voice Sounds] : lungs were clear to auscultation bilaterally [Heart Rate And Rhythm] : heart rate and rhythm were normal [Bowel Sounds] : normal bowel sounds [Abnormal Walk] : normal gait [Cyanosis, Localized] : no localized cyanosis [Nail Clubbing] : no clubbing of the fingernails [Skin Color & Pigmentation] : normal skin color and pigmentation [] : no rash [Impaired Insight] : insight and judgment were intact [Oriented To Time, Place, And Person] : oriented to person, place, and time [Affect] : the affect was normal [Mood] : the mood was normal [FreeTextEntry1] : 2+ pulses in the upper and lower extremities. No edema.

## 2022-10-25 NOTE — DISCUSSION/SUMMARY
[FreeTextEntry1] : ASCAD S/P CABG, s/p inferior wall myocardial infarction.  Continue aspirin and statin/Lovaza as well as metoprolol. \par \par HLD -recent lipid profile in reasonable range.  She will continue Lipitor and Lovaza at the current dosage.   I provided a lab requisition that she can recheck her blood work prior to her next visit. \par \par RBBB - unchanged. \par \par Mildly elevated HgbA1c in past - reminded to avoid concentrated sweets, remain attentive to weight.  She will follow-up with Dr. Zapata.\par \par 32 minutes spent on today's office visit.  \par \par She will return for a visit in six months.  [EKG obtained to assist in diagnosis and management of assessed problem(s)] : EKG obtained to assist in diagnosis and management of assessed problem(s)

## 2022-11-01 ENCOUNTER — OFFICE (OUTPATIENT)
Dept: URBAN - METROPOLITAN AREA CLINIC 12 | Facility: CLINIC | Age: 72
Setting detail: OPHTHALMOLOGY
End: 2022-11-01
Payer: MEDICARE

## 2022-11-01 DIAGNOSIS — H26.493: ICD-10-CM

## 2022-11-01 DIAGNOSIS — H16.223: ICD-10-CM

## 2022-11-01 PROCEDURE — 99024 POSTOP FOLLOW-UP VISIT: CPT | Performed by: OPTOMETRIST

## 2022-11-01 ASSESSMENT — SPHEQUIV_DERIVED
OS_SPHEQUIV: 0.375
OD_SPHEQUIV: 0.25
OD_SPHEQUIV: 0.5
OS_SPHEQUIV: 0.375

## 2022-11-01 ASSESSMENT — VISUAL ACUITY
OS_BCVA: 20/25
OD_BCVA: 20/25-1

## 2022-11-01 ASSESSMENT — REFRACTION_MANIFEST
OU_VA: 20/25
OD_VA1: 20/25
OS_AXIS: 060
OS_CYLINDER: -0.75
OD_CYLINDER: -0.50
OS_SPHERE: +0.75
OD_SPHERE: +0.50
OS_VA1: 20/25-
OD_AXIS: 045

## 2022-11-01 ASSESSMENT — REFRACTION_CURRENTRX
OS_OVR_VA: 20/
OD_VPRISM_DIRECTION: SV
OS_SPHERE: +2.00
OS_VPRISM_DIRECTION: SV
OD_OVR_VA: 20/
OD_SPHERE: +2.00

## 2022-11-01 ASSESSMENT — AXIALLENGTH_DERIVED
OS_AL: 23.3114
OS_AL: 23.3114
OD_AL: 23.4042
OD_AL: 23.3087

## 2022-11-01 ASSESSMENT — REFRACTION_AUTOREFRACTION
OS_CYLINDER: -0.75
OS_SPHERE: +0.75
OD_AXIS: 59
OD_CYLINDER: -1.00
OD_SPHERE: +1.00
OS_AXIS: 64

## 2022-11-01 ASSESSMENT — KERATOMETRY
OS_K2POWER_DIOPTERS: 44.00
OD_K1POWER_DIOPTERS: 43.25
OD_AXISANGLE_DEGREES: 127
METHOD_AUTO_MANUAL: AUTO
OD_K2POWER_DIOPTERS: 44.25
OS_AXISANGLE_DEGREES: 151
OS_K1POWER_DIOPTERS: 43.75

## 2022-11-01 ASSESSMENT — CONFRONTATIONAL VISUAL FIELD TEST (CVF)
OD_FINDINGS: FULL
OS_FINDINGS: FULL

## 2022-11-01 ASSESSMENT — TONOMETRY
OD_IOP_MMHG: 16
OS_IOP_MMHG: 18

## 2022-11-01 ASSESSMENT — SUPERFICIAL PUNCTATE KERATITIS (SPK)
OD_SPK: T
OS_SPK: T

## 2022-12-02 ENCOUNTER — OFFICE (OUTPATIENT)
Dept: URBAN - METROPOLITAN AREA CLINIC 12 | Facility: CLINIC | Age: 72
Setting detail: OPHTHALMOLOGY
End: 2022-12-02
Payer: MEDICARE

## 2022-12-02 DIAGNOSIS — H16.222: ICD-10-CM

## 2022-12-02 DIAGNOSIS — H16.221: ICD-10-CM

## 2022-12-02 DIAGNOSIS — H16.223: ICD-10-CM

## 2022-12-02 PROBLEM — H26.493 POSTERIOR CAPSULAR OPACIFICATION; BOTH EYES: Status: ACTIVE | Noted: 2022-11-01

## 2022-12-02 PROCEDURE — 99213 OFFICE O/P EST LOW 20 MIN: CPT | Performed by: OPTOMETRIST

## 2022-12-02 PROCEDURE — 83861 MICROFLUID ANALY TEARS: CPT | Performed by: OPTOMETRIST

## 2022-12-02 ASSESSMENT — SPHEQUIV_DERIVED
OD_SPHEQUIV: 0.5
OS_SPHEQUIV: 0.375
OS_SPHEQUIV: 0.25
OD_SPHEQUIV: 0.25

## 2022-12-02 ASSESSMENT — KERATOMETRY
OD_AXISANGLE_DEGREES: 139
OD_K2POWER_DIOPTERS: 44.50
OS_K2POWER_DIOPTERS: 44.00
METHOD_AUTO_MANUAL: AUTO
OS_K1POWER_DIOPTERS: 43.75
OD_K1POWER_DIOPTERS: 43.75
OS_AXISANGLE_DEGREES: 142

## 2022-12-02 ASSESSMENT — REFRACTION_MANIFEST
OS_CYLINDER: -0.75
OD_CYLINDER: -0.50
OS_AXIS: 060
OS_SPHERE: +0.75
OD_VA1: 20/25
OU_VA: 20/25
OD_AXIS: 045
OD_SPHERE: +0.50
OS_VA1: 20/25-

## 2022-12-02 ASSESSMENT — VISUAL ACUITY
OS_BCVA: 20/25-1
OD_BCVA: 20/20-2

## 2022-12-02 ASSESSMENT — REFRACTION_AUTOREFRACTION
OS_CYLINDER: -1.00
OS_AXIS: 66
OS_SPHERE: +0.75
OD_CYLINDER: -0.50
OD_SPHERE: +0.75
OD_AXIS: 72

## 2022-12-02 ASSESSMENT — REFRACTION_CURRENTRX
OS_VPRISM_DIRECTION: SV
OS_SPHERE: +2.50
OD_OVR_VA: 20/
OS_VPRISM_DIRECTION: SV
OS_SPHERE: +2.00
OD_VPRISM_DIRECTION: SV
OD_SPHERE: +2.00
OD_SPHERE: +2.50
OD_OVR_VA: 20/
OD_VPRISM_DIRECTION: SV
OS_OVR_VA: 20/
OS_OVR_VA: 20/

## 2022-12-02 ASSESSMENT — CONFRONTATIONAL VISUAL FIELD TEST (CVF)
OD_FINDINGS: FULL
OS_FINDINGS: FULL

## 2022-12-02 ASSESSMENT — SUPERFICIAL PUNCTATE KERATITIS (SPK)
OS_SPK: T
OD_SPK: T

## 2022-12-02 ASSESSMENT — AXIALLENGTH_DERIVED
OD_AL: 23.175
OS_AL: 23.3114
OS_AL: 23.3591
OD_AL: 23.2694

## 2022-12-02 ASSESSMENT — TONOMETRY
OS_IOP_MMHG: 15
OD_IOP_MMHG: 16

## 2022-12-20 ENCOUNTER — RX RENEWAL (OUTPATIENT)
Age: 72
End: 2022-12-20

## 2023-03-06 ENCOUNTER — OFFICE (OUTPATIENT)
Dept: URBAN - METROPOLITAN AREA CLINIC 12 | Facility: CLINIC | Age: 73
Setting detail: OPHTHALMOLOGY
End: 2023-03-06
Payer: MEDICARE

## 2023-03-06 ENCOUNTER — RX ONLY (RX ONLY)
Age: 73
End: 2023-03-06

## 2023-03-06 DIAGNOSIS — H90.3: ICD-10-CM

## 2023-03-06 DIAGNOSIS — H26.493: ICD-10-CM

## 2023-03-06 DIAGNOSIS — H35.413: ICD-10-CM

## 2023-03-06 DIAGNOSIS — H35.373: ICD-10-CM

## 2023-03-06 DIAGNOSIS — H16.223: ICD-10-CM

## 2023-03-06 PROCEDURE — 92567 TYMPANOMETRY: CPT

## 2023-03-06 PROCEDURE — 92014 COMPRE OPH EXAM EST PT 1/>: CPT | Performed by: OPTOMETRIST

## 2023-03-06 PROCEDURE — 92134 CPTRZ OPH DX IMG PST SGM RTA: CPT | Performed by: OPTOMETRIST

## 2023-03-06 PROCEDURE — 92557 COMPREHENSIVE HEARING TEST: CPT

## 2023-03-06 ASSESSMENT — REFRACTION_MANIFEST
OS_SPHERE: +0.75
OS_VA1: 20/25-
OS_AXIS: 060
OS_CYLINDER: -0.75
OD_SPHERE: +0.50
OD_VA1: 20/25
OU_VA: 20/25
OD_AXIS: 045
OD_CYLINDER: -0.50

## 2023-03-06 ASSESSMENT — REFRACTION_AUTOREFRACTION
OS_AXIS: 054
OS_SPHERE: +0.50
OD_AXIS: 052
OD_SPHERE: +0.75
OS_CYLINDER: -0.50
OD_CYLINDER: -0.75

## 2023-03-06 ASSESSMENT — CONFRONTATIONAL VISUAL FIELD TEST (CVF)
OS_FINDINGS: FULL
OD_FINDINGS: FULL

## 2023-03-06 ASSESSMENT — KERATOMETRY
METHOD_AUTO_MANUAL: AUTO
OD_AXISANGLE_DEGREES: 136
OS_K1POWER_DIOPTERS: 44.00
OD_K2POWER_DIOPTERS: 44.50
OS_AXISANGLE_DEGREES: 119
OS_K2POWER_DIOPTERS: 44.25
OD_K1POWER_DIOPTERS: 43.50

## 2023-03-06 ASSESSMENT — VISUAL ACUITY
OD_BCVA: 20/25
OS_BCVA: 20/25-1

## 2023-03-06 ASSESSMENT — REFRACTION_CURRENTRX
OD_VPRISM_DIRECTION: SV
OD_VPRISM_DIRECTION: SV
OS_SPHERE: +2.00
OS_SPHERE: +2.50
OS_VPRISM_DIRECTION: SV
OD_OVR_VA: 20/
OD_SPHERE: +2.50
OS_VPRISM_DIRECTION: SV
OS_OVR_VA: 20/
OD_SPHERE: +2.00
OS_OVR_VA: 20/
OD_OVR_VA: 20/

## 2023-03-06 ASSESSMENT — SPHEQUIV_DERIVED
OS_SPHEQUIV: 0.375
OD_SPHEQUIV: 0.375
OD_SPHEQUIV: 0.25
OS_SPHEQUIV: 0.25

## 2023-03-06 ASSESSMENT — TONOMETRY
OS_IOP_MMHG: 19
OD_IOP_MMHG: 17

## 2023-03-06 ASSESSMENT — AXIALLENGTH_DERIVED
OS_AL: 23.2694
OD_AL: 23.2667
OD_AL: 23.3142
OS_AL: 23.2221

## 2023-03-06 ASSESSMENT — SUPERFICIAL PUNCTATE KERATITIS (SPK)
OD_SPK: 1+ 2+
OS_SPK: 1+ 2+

## 2023-04-05 ENCOUNTER — OFFICE (OUTPATIENT)
Dept: URBAN - METROPOLITAN AREA CLINIC 12 | Facility: CLINIC | Age: 73
Setting detail: OPHTHALMOLOGY
End: 2023-04-05
Payer: MEDICARE

## 2023-04-05 DIAGNOSIS — H04.122: ICD-10-CM

## 2023-04-05 DIAGNOSIS — H04.121: ICD-10-CM

## 2023-04-05 DIAGNOSIS — H04.123: ICD-10-CM

## 2023-04-05 PROCEDURE — 83861 MICROFLUID ANALY TEARS: CPT | Performed by: OPTOMETRIST

## 2023-04-05 PROCEDURE — 92012 INTRM OPH EXAM EST PATIENT: CPT | Performed by: OPTOMETRIST

## 2023-04-05 ASSESSMENT — REFRACTION_AUTOREFRACTION
OS_AXIS: 050
OS_CYLINDER: -0.75
OD_SPHERE: +1.00
OD_CYLINDER: -0.75
OS_SPHERE: +0.75
OD_AXIS: 058

## 2023-04-05 ASSESSMENT — VISUAL ACUITY
OD_BCVA: 20/25
OS_BCVA: 20/20-2

## 2023-04-05 ASSESSMENT — REFRACTION_MANIFEST
OD_VA1: 20/25
OD_CYLINDER: -0.50
OS_VA1: 20/25-
OS_CYLINDER: -0.75
OU_VA: 20/25
OS_SPHERE: +0.75
OD_SPHERE: +0.50
OS_AXIS: 060
OD_AXIS: 045

## 2023-04-05 ASSESSMENT — REFRACTION_CURRENTRX
OD_OVR_VA: 20/
OD_SPHERE: +2.50
OS_SPHERE: +2.50
OD_SPHERE: +2.00
OD_VPRISM_DIRECTION: SV
OS_OVR_VA: 20/
OS_VPRISM_DIRECTION: SV
OS_VPRISM_DIRECTION: SV
OS_SPHERE: +2.00
OD_VPRISM_DIRECTION: SV
OS_OVR_VA: 20/
OD_OVR_VA: 20/

## 2023-04-05 ASSESSMENT — TONOMETRY
OS_IOP_MMHG: 18
OD_IOP_MMHG: 18

## 2023-04-05 ASSESSMENT — AXIALLENGTH_DERIVED
OS_AL: 23.1777
OD_AL: 23.1723
OD_AL: 23.3142
OS_AL: 23.1777

## 2023-04-05 ASSESSMENT — CONFRONTATIONAL VISUAL FIELD TEST (CVF)
OD_FINDINGS: FULL
OS_FINDINGS: FULL

## 2023-04-05 ASSESSMENT — KERATOMETRY
OD_K1POWER_DIOPTERS: 43.50
OD_K2POWER_DIOPTERS: 44.50
OD_AXISANGLE_DEGREES: 125
METHOD_AUTO_MANUAL: AUTO
OS_AXISANGLE_DEGREES: 125
OS_K1POWER_DIOPTERS: 44.00
OS_K2POWER_DIOPTERS: 44.50

## 2023-04-05 ASSESSMENT — SPHEQUIV_DERIVED
OD_SPHEQUIV: 0.625
OD_SPHEQUIV: 0.25
OS_SPHEQUIV: 0.375
OS_SPHEQUIV: 0.375

## 2023-04-05 ASSESSMENT — SUPERFICIAL PUNCTATE KERATITIS (SPK)
OS_SPK: 1+ 2+
OD_SPK: 1+ 2+

## 2023-04-27 ENCOUNTER — OFFICE (OUTPATIENT)
Dept: URBAN - METROPOLITAN AREA CLINIC 12 | Facility: CLINIC | Age: 73
Setting detail: OPHTHALMOLOGY
End: 2023-04-27
Payer: MEDICARE

## 2023-04-27 ENCOUNTER — APPOINTMENT (OUTPATIENT)
Dept: CARDIOLOGY | Facility: CLINIC | Age: 73
End: 2023-04-27
Payer: MEDICARE

## 2023-04-27 ENCOUNTER — NON-APPOINTMENT (OUTPATIENT)
Age: 73
End: 2023-04-27

## 2023-04-27 VITALS
BODY MASS INDEX: 27.64 KG/M2 | SYSTOLIC BLOOD PRESSURE: 142 MMHG | DIASTOLIC BLOOD PRESSURE: 98 MMHG | WEIGHT: 161 LBS | HEART RATE: 68 BPM | OXYGEN SATURATION: 94 %

## 2023-04-27 DIAGNOSIS — S05.02XA: ICD-10-CM

## 2023-04-27 PROCEDURE — 93000 ELECTROCARDIOGRAM COMPLETE: CPT

## 2023-04-27 PROCEDURE — 99214 OFFICE O/P EST MOD 30 MIN: CPT | Mod: 25

## 2023-04-27 PROCEDURE — 92012 INTRM OPH EXAM EST PATIENT: CPT | Performed by: STUDENT IN AN ORGANIZED HEALTH CARE EDUCATION/TRAINING PROGRAM

## 2023-04-27 ASSESSMENT — REFRACTION_MANIFEST
OS_AXIS: 060
OS_SPHERE: +0.75
OS_CYLINDER: -0.75
OD_AXIS: 045
OD_SPHERE: +0.50
OD_CYLINDER: -0.50
OS_VA1: 20/25-
OU_VA: 20/25
OD_VA1: 20/25

## 2023-04-27 ASSESSMENT — SUPERFICIAL PUNCTATE KERATITIS (SPK)
OD_SPK: 1+ 2+
OS_SPK: 1+ 2+

## 2023-04-27 ASSESSMENT — CONFRONTATIONAL VISUAL FIELD TEST (CVF)
OD_FINDINGS: FULL
OS_FINDINGS: FULL

## 2023-04-27 ASSESSMENT — KERATOMETRY
OS_K2POWER_DIOPTERS: 44.00
OD_AXISANGLE_DEGREES: 140
OS_K1POWER_DIOPTERS: 44.00
OD_K1POWER_DIOPTERS: 43.50
METHOD_AUTO_MANUAL: AUTO
OD_K2POWER_DIOPTERS: 44.50
OS_AXISANGLE_DEGREES: 090

## 2023-04-27 ASSESSMENT — TONOMETRY
OD_IOP_MMHG: 14
OS_IOP_MMHG: 12

## 2023-04-27 ASSESSMENT — REFRACTION_CURRENTRX
OS_SPHERE: +2.50
OD_SPHERE: +2.50
OD_OVR_VA: 20/
OS_VPRISM_DIRECTION: SV
OD_VPRISM_DIRECTION: SV
OS_OVR_VA: 20/
OD_SPHERE: +2.00
OS_VPRISM_DIRECTION: SV
OS_OVR_VA: 20/
OD_VPRISM_DIRECTION: SV
OD_OVR_VA: 20/
OS_SPHERE: +2.00

## 2023-04-27 ASSESSMENT — SPHEQUIV_DERIVED
OD_SPHEQUIV: 0.25
OS_SPHEQUIV: 0.375
OD_SPHEQUIV: 0.375
OS_SPHEQUIV: 0.375

## 2023-04-27 ASSESSMENT — REFRACTION_AUTOREFRACTION
OS_SPHERE: +0.75
OD_SPHERE: +0.75
OS_CYLINDER: -0.75
OD_CYLINDER: -0.75
OD_AXIS: 060
OS_AXIS: 050

## 2023-04-27 ASSESSMENT — AXIALLENGTH_DERIVED
OS_AL: 23.2667
OS_AL: 23.2667
OD_AL: 23.3142
OD_AL: 23.2667

## 2023-04-27 ASSESSMENT — VISUAL ACUITY
OS_BCVA: 20/20-1
OD_BCVA: 20/25-1

## 2023-04-27 ASSESSMENT — CORNEAL TRAUMA - ABRASION: OS_ABRASION: PRESENT

## 2023-04-27 NOTE — DISCUSSION/SUMMARY
[FreeTextEntry1] : ASCAD S/P CABG, s/p remote inferior wall myocardial infarction.  Continue aspirin and statin/Lovaza as well as metoprolol. \par \par HLD - lipid profile in good range; continue Lipitor and Lovaza at the current dosage.   \par I provided a lab requisition that she can recheck her blood work prior to her next visit. \par \par RBBB - unchanged. \par \par Elevated HgbA1c in past - reminded to avoid concentrated sweets, remain attentive to weight.  \par \par 32 minutes spent on today's office visit.  \par \par She will return for a visit in six months.  Will arrange TTE to re-assess LV function at that time. [EKG obtained to assist in diagnosis and management of assessed problem(s)] : EKG obtained to assist in diagnosis and management of assessed problem(s)

## 2023-04-27 NOTE — HISTORY OF PRESENT ILLNESS
[FreeTextEntry1] : A cardiac catheterization was performed last May in May 2022, after a stress test abnormality.  The study demonstrated a patent LIMA graft to the obtuse marginal branch and occlusion of the MARY to the LAD, although the native LAD has only mild CAD.  occlusion of the saphenous vein graft to the RCA was seen, although the RCA was seen to be well supplied via collaterals.  Ongoing medical therapy was advised.\par \par Since I saw her last, she has been stable from a cardiac standpoint.  She reports no episodes of exertional chest discomfort.  She reports mild GANDHI at times but attributes this age and deconditioning; it does not limit her activities.  There have been no palpitations & no episodes of lightheadedness. She describes no episodes of orthopnea or PND.\par \par Medications are unchanged.

## 2023-04-27 NOTE — PHYSICAL EXAM

## 2023-04-27 NOTE — ASSESSMENT
[FreeTextEntry1] : ASCAD\par      S/P CABG on October 10, 2003 by Dr. Ogden, with LIMA to OM, MARY to LAD and SVG to R-PDA\par      Inferior wall myocardial infarction in 1989\par      Cardiac cath 5/3/22 -\par           Occlusion of the MARY to the LAD, although the native LAD has only mild CAD.  \par           Patent LIMA graft to the obtuse marginal branch\par           Occlusion of SVG to the RCA; RCA well supplied by collaterals.  \par \par Hyperlipidemia\par \par RBBB\par \par Very mild mitral and tricuspid valve insufficiency.\par \par Gastroesophageal reflux disease\par \par Fatigue, cause unclear. Holding statin and reducing beta blocker had no benefit.

## 2023-05-02 ENCOUNTER — OFFICE (OUTPATIENT)
Dept: URBAN - METROPOLITAN AREA CLINIC 12 | Facility: CLINIC | Age: 73
Setting detail: OPHTHALMOLOGY
End: 2023-05-02
Payer: MEDICARE

## 2023-05-02 DIAGNOSIS — H04.123: ICD-10-CM

## 2023-05-02 DIAGNOSIS — S05.02XD: ICD-10-CM

## 2023-05-02 PROBLEM — H16.223 DRY EYE SYNDROME K SICCA; BOTH EYES: Status: ACTIVE | Noted: 2023-05-02

## 2023-05-02 PROCEDURE — 92012 INTRM OPH EXAM EST PATIENT: CPT | Performed by: OPTOMETRIST

## 2023-05-02 ASSESSMENT — KERATOMETRY
OD_AXISANGLE_DEGREES: 135
OD_K2POWER_DIOPTERS: 44.25
OS_K1POWER_DIOPTERS: 43.75
METHOD_AUTO_MANUAL: AUTO
OS_AXISANGLE_DEGREES: 140
OD_K1POWER_DIOPTERS: 43.25
OS_K2POWER_DIOPTERS: 44.00

## 2023-05-02 ASSESSMENT — SPHEQUIV_DERIVED
OD_SPHEQUIV: 0.5
OD_SPHEQUIV: 0.25
OS_SPHEQUIV: 0.375
OS_SPHEQUIV: 0.375

## 2023-05-02 ASSESSMENT — REFRACTION_AUTOREFRACTION
OD_AXIS: 063
OS_AXIS: 064
OD_SPHERE: +1.00
OS_CYLINDER: -1.25
OD_CYLINDER: -1.00
OS_SPHERE: +1.00

## 2023-05-02 ASSESSMENT — REFRACTION_CURRENTRX
OD_SPHERE: +2.50
OS_OVR_VA: 20/
OD_SPHERE: +2.00
OS_SPHERE: +2.00
OS_SPHERE: +2.50
OD_VPRISM_DIRECTION: SV
OD_OVR_VA: 20/
OD_OVR_VA: 20/
OS_VPRISM_DIRECTION: SV
OS_OVR_VA: 20/
OS_VPRISM_DIRECTION: SV
OD_VPRISM_DIRECTION: SV

## 2023-05-02 ASSESSMENT — VISUAL ACUITY
OD_BCVA: 20/20
OS_BCVA: 20/20-1

## 2023-05-02 ASSESSMENT — REFRACTION_MANIFEST
OD_SPHERE: +0.50
OD_CYLINDER: -0.50
OS_AXIS: 060
OS_CYLINDER: -0.75
OS_VA1: 20/25-
OD_AXIS: 045
OD_VA1: 20/25
OU_VA: 20/25
OS_SPHERE: +0.75

## 2023-05-02 ASSESSMENT — CONFRONTATIONAL VISUAL FIELD TEST (CVF)
OS_FINDINGS: FULL
OD_FINDINGS: FULL

## 2023-05-02 ASSESSMENT — SUPERFICIAL PUNCTATE KERATITIS (SPK)
OD_SPK: 1+ 2+
OS_SPK: 1+ 2+

## 2023-05-02 ASSESSMENT — AXIALLENGTH_DERIVED
OD_AL: 23.4042
OS_AL: 23.3114
OD_AL: 23.3087
OS_AL: 23.3114

## 2023-05-02 ASSESSMENT — CORNEAL TRAUMA - ABRASION: OS_ABRASION: ABSENT

## 2023-05-02 ASSESSMENT — TONOMETRY: OD_IOP_MMHG: 15

## 2023-06-06 ENCOUNTER — OFFICE (OUTPATIENT)
Dept: URBAN - METROPOLITAN AREA CLINIC 12 | Facility: CLINIC | Age: 73
Setting detail: OPHTHALMOLOGY
End: 2023-06-06
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
## 2023-06-06 PROCEDURE — NO SHOW FE NO SHOW FEE: Performed by: OPTOMETRIST

## 2023-06-20 ENCOUNTER — RX ONLY (RX ONLY)
Age: 73
End: 2023-06-20

## 2023-06-20 ENCOUNTER — OFFICE (OUTPATIENT)
Dept: URBAN - METROPOLITAN AREA CLINIC 12 | Facility: CLINIC | Age: 73
Setting detail: OPHTHALMOLOGY
End: 2023-06-20
Payer: MEDICARE

## 2023-06-20 DIAGNOSIS — H04.123: ICD-10-CM

## 2023-06-20 PROCEDURE — 92012 INTRM OPH EXAM EST PATIENT: CPT | Performed by: OPTOMETRIST

## 2023-06-20 ASSESSMENT — SUPERFICIAL PUNCTATE KERATITIS (SPK)
OS_SPK: 1+ 2+
OD_SPK: 1+ 2+

## 2023-06-20 ASSESSMENT — KERATOMETRY
OS_K1POWER_DIOPTERS: 44.00
OS_K2POWER_DIOPTERS: 44.00
METHOD_AUTO_MANUAL: AUTO
OD_K2POWER_DIOPTERS: 44.50
OD_AXISANGLE_DEGREES: 138
OD_K1POWER_DIOPTERS: 43.25
OS_AXISANGLE_DEGREES: 090

## 2023-06-20 ASSESSMENT — REFRACTION_AUTOREFRACTION
OD_CYLINDER: -0.75
OS_SPHERE: +0.75
OD_AXIS: 054
OS_AXIS: 063
OD_SPHERE: +1.00
OS_CYLINDER: -1.00

## 2023-06-20 ASSESSMENT — REFRACTION_CURRENTRX
OS_SPHERE: +2.00
OD_OVR_VA: 20/
OS_VPRISM_DIRECTION: SV
OS_OVR_VA: 20/
OD_VPRISM_DIRECTION: SV
OD_VPRISM_DIRECTION: SV
OD_SPHERE: +2.00
OD_OVR_VA: 20/
OS_SPHERE: +2.50
OS_VPRISM_DIRECTION: SV
OS_OVR_VA: 20/
OD_SPHERE: +2.50

## 2023-06-20 ASSESSMENT — AXIALLENGTH_DERIVED
OS_AL: 23.2667
OD_AL: 23.3591
OD_AL: 23.2167
OS_AL: 23.3142

## 2023-06-20 ASSESSMENT — SPHEQUIV_DERIVED
OS_SPHEQUIV: 0.375
OS_SPHEQUIV: 0.25
OD_SPHEQUIV: 0.25
OD_SPHEQUIV: 0.625

## 2023-06-20 ASSESSMENT — TONOMETRY
OS_IOP_MMHG: 18
OD_IOP_MMHG: 15

## 2023-06-20 ASSESSMENT — REFRACTION_MANIFEST
OD_VA1: 20/25
OS_CYLINDER: -0.75
OD_SPHERE: +0.50
OS_SPHERE: +0.75
OS_AXIS: 060
OD_AXIS: 045
OS_VA1: 20/25-
OD_CYLINDER: -0.50
OU_VA: 20/25

## 2023-06-20 ASSESSMENT — CORNEAL TRAUMA - ABRASION: OS_ABRASION: ABSENT

## 2023-06-20 ASSESSMENT — CONFRONTATIONAL VISUAL FIELD TEST (CVF)
OS_FINDINGS: FULL
OD_FINDINGS: FULL

## 2023-06-20 ASSESSMENT — VISUAL ACUITY
OD_BCVA: 20/25-1
OS_BCVA: 20/20-1

## 2023-08-21 ENCOUNTER — APPOINTMENT (OUTPATIENT)
Dept: CARDIOLOGY | Facility: CLINIC | Age: 73
End: 2023-08-21
Payer: MEDICARE

## 2023-08-21 VITALS
WEIGHT: 148 LBS | SYSTOLIC BLOOD PRESSURE: 122 MMHG | DIASTOLIC BLOOD PRESSURE: 76 MMHG | RESPIRATION RATE: 17 BRPM | BODY MASS INDEX: 25.27 KG/M2 | OXYGEN SATURATION: 96 % | HEART RATE: 67 BPM | HEIGHT: 64 IN

## 2023-08-21 DIAGNOSIS — I25.2 OLD MYOCARDIAL INFARCTION: ICD-10-CM

## 2023-08-21 PROCEDURE — 99215 OFFICE O/P EST HI 40 MIN: CPT | Mod: 25

## 2023-08-21 PROCEDURE — 93000 ELECTROCARDIOGRAM COMPLETE: CPT

## 2023-08-21 NOTE — HISTORY OF PRESENT ILLNESS
[FreeTextEntry1] : A cardiac catheterization was most recently performed last May in May 2022, after a stress test was abnormal.  This study demonstrated a patent LIMA to the OM branch and occlusion of the MARY to the LAD, although the native LAD had only mild CAD.  Occlusion of the saphenous vein graft to the RCA was also seen, but the RCA was well supplied via collaterals.  Ongoing medical therapy was advised at that time.  As she returns today, she remains active and well.  From a cardiac standpoint, she describes no episodes of exertional chest discomfort.  She reports mild GANDHI at times but attributes this age and deconditioning; this is unchanged and does not limit her activities.  She reports no palpitations; there have been no episodes of lightheadedness/LOC. She describes no episodes of orthopnea or PND.  Medications are unchanged.

## 2023-08-21 NOTE — DISCUSSION/SUMMARY
[EKG obtained to assist in diagnosis and management of assessed problem(s)] : EKG obtained to assist in diagnosis and management of assessed problem(s) [FreeTextEntry1] : I reassured Mrs. Devlin that her exam today remains stable with normal vital signs.  EKG is unchanged, showing sinus rhythm a right bundle branch block pattern and inferior wall infarct of indeterminate age.    There is no cardiac contraindication to the planned colonoscopy.  ASCAD S/P CABG, s/p remote inferior wall myocardial infarction.  She will continue on her usual statin, Lovaza & metoprolol.  She may hold aspirin for 5 days prior to the colonoscopy.  HLD -most recent lipid profile was in good range and she will continue Lipitor/Lovaza at the current dose.     RBBB - unchanged.   42 minutes spent on today's office visit.    She has a previously scheduled appointment in October for return visit.

## 2023-08-21 NOTE — ASSESSMENT
[FreeTextEntry1] : ASCAD      Inferior wall myocardial infarction in 1989      S/P CABG on October 10, 2003 by Dr. Ogden, with LIMA to OM, MARY to LAD and SVG to R-PDA      Cardiac cath 5/3/22 -           Occlusion of the MARY to the LAD, although the native LAD has only mild CAD.             Patent LIMA graft to the obtuse marginal branch           Occlusion of SVG to the RCA; RCA well supplied by collaterals.    Hyperlipidemia  RBBB  Very mild mitral and tricuspid valve insufficiency.  Gastroesophageal reflux disease  Fatigue, cause unclear. Holding statin and reducing beta blocker had no benefit.

## 2023-09-20 ENCOUNTER — OFFICE (OUTPATIENT)
Dept: URBAN - METROPOLITAN AREA CLINIC 12 | Facility: CLINIC | Age: 73
Setting detail: OPHTHALMOLOGY
End: 2023-09-20
Payer: MEDICARE

## 2023-09-20 DIAGNOSIS — H04.123: ICD-10-CM

## 2023-09-20 PROCEDURE — 99213 OFFICE O/P EST LOW 20 MIN: CPT | Performed by: OPTOMETRIST

## 2023-09-20 ASSESSMENT — REFRACTION_MANIFEST
OD_AXIS: 062
OS_CYLINDER: -0.50
OD_SPHERE: +1.00
OS_AXIS: 062
OD_CYLINDER: -1.00
OU_VA: 20/25
OS_SPHERE: +0.25
OD_VA1: 20/25
OS_VA1: 20/25-

## 2023-09-20 ASSESSMENT — KERATOMETRY
OD_K1POWER_DIOPTERS: 43.25
METHOD_AUTO_MANUAL: AUTO
OD_K2POWER_DIOPTERS: 44.75
OS_K1POWER_DIOPTERS: 44.00
OS_K2POWER_DIOPTERS: 44.50
OS_AXISANGLE_DEGREES: 104
OD_AXISANGLE_DEGREES: 141

## 2023-09-20 ASSESSMENT — REFRACTION_CURRENTRX
OD_VPRISM_DIRECTION: SV
OD_OVR_VA: 20/
OD_OVR_VA: 20/
OS_OVR_VA: 20/
OS_SPHERE: +2.50
OS_VPRISM_DIRECTION: SV
OS_VPRISM_DIRECTION: SV
OS_SPHERE: +2.00
OD_SPHERE: +2.50
OD_SPHERE: +2.00
OS_OVR_VA: 20/
OD_VPRISM_DIRECTION: SV

## 2023-09-20 ASSESSMENT — SPHEQUIV_DERIVED
OD_SPHEQUIV: 0.5
OS_SPHEQUIV: 0
OS_SPHEQUIV: 0
OD_SPHEQUIV: 0.5

## 2023-09-20 ASSESSMENT — SUPERFICIAL PUNCTATE KERATITIS (SPK)
OD_SPK: 1+ 2+
OS_SPK: 1+ 2+

## 2023-09-20 ASSESSMENT — REFRACTION_AUTOREFRACTION
OS_CYLINDER: -0.50
OD_CYLINDER: -1.00
OD_AXIS: 062
OS_AXIS: 062
OD_SPHERE: +1.00
OS_SPHERE: +0.25

## 2023-09-20 ASSESSMENT — AXIALLENGTH_DERIVED
OD_AL: 23.2194
OD_AL: 23.2194
OS_AL: 23.3196
OS_AL: 23.3196

## 2023-09-20 ASSESSMENT — CONFRONTATIONAL VISUAL FIELD TEST (CVF)
OS_FINDINGS: FULL
OD_FINDINGS: FULL

## 2023-09-20 ASSESSMENT — VISUAL ACUITY
OD_BCVA: 20/25-2
OS_BCVA: 20/20-2

## 2023-09-20 ASSESSMENT — TONOMETRY
OD_IOP_MMHG: 12
OS_IOP_MMHG: 12

## 2023-10-27 ENCOUNTER — NON-APPOINTMENT (OUTPATIENT)
Age: 73
End: 2023-10-27

## 2023-10-27 ENCOUNTER — APPOINTMENT (OUTPATIENT)
Dept: CARDIOLOGY | Facility: CLINIC | Age: 73
End: 2023-10-27
Payer: MEDICARE

## 2023-10-27 VITALS
HEART RATE: 62 BPM | SYSTOLIC BLOOD PRESSURE: 148 MMHG | OXYGEN SATURATION: 95 % | WEIGHT: 144 LBS | RESPIRATION RATE: 15 BRPM | HEIGHT: 64 IN | BODY MASS INDEX: 24.59 KG/M2 | DIASTOLIC BLOOD PRESSURE: 80 MMHG

## 2023-10-27 PROCEDURE — 93000 ELECTROCARDIOGRAM COMPLETE: CPT

## 2023-10-27 PROCEDURE — 93306 TTE W/DOPPLER COMPLETE: CPT

## 2023-10-27 PROCEDURE — 99214 OFFICE O/P EST MOD 30 MIN: CPT | Mod: 25

## 2023-12-20 ENCOUNTER — OFFICE (OUTPATIENT)
Dept: URBAN - METROPOLITAN AREA CLINIC 12 | Facility: CLINIC | Age: 73
Setting detail: OPHTHALMOLOGY
End: 2023-12-20
Payer: MEDICARE

## 2023-12-20 DIAGNOSIS — H35.373: ICD-10-CM

## 2023-12-20 DIAGNOSIS — H43.393: ICD-10-CM

## 2023-12-20 DIAGNOSIS — H43.813: ICD-10-CM

## 2023-12-20 DIAGNOSIS — H35.413: ICD-10-CM

## 2023-12-20 DIAGNOSIS — H04.123: ICD-10-CM

## 2023-12-20 DIAGNOSIS — Z79.899: ICD-10-CM

## 2023-12-20 DIAGNOSIS — M35.00: ICD-10-CM

## 2023-12-20 PROCEDURE — 92250 FUNDUS PHOTOGRAPHY W/I&R: CPT | Performed by: OPTOMETRIST

## 2023-12-20 PROCEDURE — 92083 EXTENDED VISUAL FIELD XM: CPT | Performed by: OPTOMETRIST

## 2023-12-20 PROCEDURE — 92014 COMPRE OPH EXAM EST PT 1/>: CPT | Performed by: OPTOMETRIST

## 2023-12-20 ASSESSMENT — REFRACTION_MANIFEST
OS_SPHERE: +0.25
OS_AXIS: 062
OD_SPHERE: +1.00
OS_VA1: 20/25-
OD_CYLINDER: -1.00
OD_VA1: 20/25
OD_AXIS: 062
OS_CYLINDER: -0.50
OU_VA: 20/25

## 2023-12-20 ASSESSMENT — REFRACTION_CURRENTRX
OS_SPHERE: +2.50
OS_OVR_VA: 20/
OS_SPHERE: +2.00
OD_SPHERE: +2.50
OD_VPRISM_DIRECTION: SV
OD_OVR_VA: 20/
OD_VPRISM_DIRECTION: SV
OD_OVR_VA: 20/
OS_VPRISM_DIRECTION: SV
OS_VPRISM_DIRECTION: SV
OD_SPHERE: +2.00
OS_OVR_VA: 20/

## 2023-12-20 ASSESSMENT — REFRACTION_AUTOREFRACTION
OS_AXIS: 065
OD_SPHERE: +0.75
OS_SPHERE: +0.50
OD_AXIS: 049
OD_CYLINDER: -1.00
OS_CYLINDER: -0.75

## 2023-12-20 ASSESSMENT — SUPERFICIAL PUNCTATE KERATITIS (SPK)
OS_SPK: 1+ 2+
OD_SPK: 1+ 2+

## 2023-12-20 ASSESSMENT — SPHEQUIV_DERIVED
OS_SPHEQUIV: 0
OD_SPHEQUIV: 0.5
OD_SPHEQUIV: 0.25
OS_SPHEQUIV: 0.125

## 2023-12-20 ASSESSMENT — CONFRONTATIONAL VISUAL FIELD TEST (CVF)
OD_FINDINGS: FULL
OS_FINDINGS: FULL

## 2024-01-22 ENCOUNTER — OFFICE (OUTPATIENT)
Dept: URBAN - METROPOLITAN AREA CLINIC 12 | Facility: CLINIC | Age: 74
Setting detail: OPHTHALMOLOGY
End: 2024-01-22
Payer: MEDICARE

## 2024-01-22 ENCOUNTER — RX ONLY (RX ONLY)
Age: 74
End: 2024-01-22

## 2024-01-22 DIAGNOSIS — M35.00: ICD-10-CM

## 2024-01-22 DIAGNOSIS — H04.123: ICD-10-CM

## 2024-01-22 PROCEDURE — 99213 OFFICE O/P EST LOW 20 MIN: CPT | Performed by: OPTOMETRIST

## 2024-01-22 ASSESSMENT — REFRACTION_CURRENTRX
OD_OVR_VA: 20/
OS_OVR_VA: 20/
OS_VPRISM_DIRECTION: SV
OD_SPHERE: +2.50
OD_OVR_VA: 20/
OS_SPHERE: +2.00
OD_VPRISM_DIRECTION: SV
OD_SPHERE: +2.00
OS_VPRISM_DIRECTION: SV
OS_SPHERE: +2.50
OS_OVR_VA: 20/
OD_VPRISM_DIRECTION: SV

## 2024-01-22 ASSESSMENT — SPHEQUIV_DERIVED
OS_SPHEQUIV: 0
OS_SPHEQUIV: 0
OD_SPHEQUIV: 0.375
OD_SPHEQUIV: 0.5

## 2024-01-22 ASSESSMENT — REFRACTION_AUTOREFRACTION
OS_SPHERE: +0.25
OD_CYLINDER: -0.75
OS_CYLINDER: -0.50
OS_AXIS: 067
OD_SPHERE: +0.75
OD_AXIS: 055

## 2024-01-22 ASSESSMENT — REFRACTION_MANIFEST
OD_SPHERE: +1.00
OS_VA1: 20/25-
OS_SPHERE: +0.25
OU_VA: 20/25
OS_CYLINDER: -0.50
OD_AXIS: 062
OD_VA1: 20/25
OD_CYLINDER: -1.00
OS_AXIS: 062

## 2024-01-22 ASSESSMENT — CONFRONTATIONAL VISUAL FIELD TEST (CVF)
OD_FINDINGS: FULL
OS_FINDINGS: FULL

## 2024-01-22 ASSESSMENT — SUPERFICIAL PUNCTATE KERATITIS (SPK)
OD_SPK: 1+ 2+
OS_SPK: 1+ 2+

## 2024-05-03 ENCOUNTER — APPOINTMENT (OUTPATIENT)
Dept: CARDIOLOGY | Facility: CLINIC | Age: 74
End: 2024-05-03
Payer: MEDICARE

## 2024-05-05 NOTE — DISCUSSION/SUMMARY
[FreeTextEntry1] : EKG today shows:   Sinus Rhythm at 64 bpm.  Right bundle branch block, old inferior infarct; no change.  PLAN: 1.  ASCAD S/P CABG, s/p remote inferior wall myocardial infarction.  Remains free of ischemic sxs. - continues statin, Lovaza & metoprolol.  - continue aspirin  2.  HLD - recent lipid profile was in good range  - continue Lipitor/Lovaza  3.  RBBB - unchanged.   33 minutes spent on today's office visit.    She will return for a visit in 6 months.

## 2024-05-05 NOTE — ASSESSMENT
[FreeTextEntry1] : ============================================================ ASCAD  Inferior wall myocardial infarction in 1989  S/P CABG on October 10, 2003 by Dr. Ogden, with LIMA to OM, MARY to LAD and SVG to R-PDA  Cardiac cath 5/3/22 -  Occlusion of the MARY to the LAD, although the native LAD has only mild CAD.  Patent LIMA graft to the obtuse marginal branch  Occlusion of SVG to the RCA; RCA well supplied by collaterals.  Hyperlipidemia  RBBB  Mild AI and MR  Gastroesophageal reflux disease  Fatigue, cause unclear. Holding statin and reducing beta blocker had no benefit.

## 2024-05-05 NOTE — HISTORY OF PRESENT ILLNESS
[FreeTextEntry1] : A cardiac catheterization was most recently performed last May in May 2022, after a stress test was abnormal.  This study demonstrated a patent LIMA to the OM branch and occlusion of the MARY to the LAD, although the native LAD had only mild CAD.  Occlusion of the saphenous vein graft to the RCA was also seen, but the RCA was well supplied via collaterals.  Ongoing medical therapy was advised at that time.  As she returns today,    she remains well.  She continues her usual activities and there have been no episodes of exertional chest discomfort.  The degree of mild GANDHI she is described previously remains unchanged and does not limit her activities; she mowed her lawn yesterday without problem.  She describes no palpitations & no episodes of lightheadedness/LOC. She reports no orthopnea or PND.  Medications are unchanged.

## 2024-05-05 NOTE — REASON FOR VISIT
[FreeTextEntry1] : Appt. rescheduled.  PRELIMINARY NOTE  Gwendolyn Hoamara returns today for f/u re CAD S/P CABG, a remote hx. of inferior wall myocardial infarction, hyperlipidemia and RBBB.

## 2024-05-07 ENCOUNTER — APPOINTMENT (OUTPATIENT)
Dept: CARDIOLOGY | Facility: CLINIC | Age: 74
End: 2024-05-07
Payer: MEDICARE

## 2024-05-07 ENCOUNTER — NON-APPOINTMENT (OUTPATIENT)
Age: 74
End: 2024-05-07

## 2024-05-07 VITALS
HEART RATE: 68 BPM | OXYGEN SATURATION: 95 % | SYSTOLIC BLOOD PRESSURE: 126 MMHG | HEIGHT: 64 IN | BODY MASS INDEX: 26.63 KG/M2 | DIASTOLIC BLOOD PRESSURE: 70 MMHG | WEIGHT: 156 LBS

## 2024-05-07 DIAGNOSIS — I45.2 BIFASCICULAR BLOCK: ICD-10-CM

## 2024-05-07 DIAGNOSIS — M35.00 SICCA SYNDROME, UNSPECIFIED: ICD-10-CM

## 2024-05-07 DIAGNOSIS — Z95.1 PRESENCE OF AORTOCORONARY BYPASS GRAFT: ICD-10-CM

## 2024-05-07 DIAGNOSIS — E78.5 HYPERLIPIDEMIA, UNSPECIFIED: ICD-10-CM

## 2024-05-07 DIAGNOSIS — I25.10 ATHEROSCLEROTIC HEART DISEASE OF NATIVE CORONARY ARTERY W/OUT ANGINA PECTORIS: ICD-10-CM

## 2024-05-07 PROCEDURE — G2211 COMPLEX E/M VISIT ADD ON: CPT

## 2024-05-07 PROCEDURE — 99214 OFFICE O/P EST MOD 30 MIN: CPT

## 2024-05-07 PROCEDURE — 93000 ELECTROCARDIOGRAM COMPLETE: CPT

## 2024-05-07 NOTE — REASON FOR VISIT
[FreeTextEntry1] :   Gwendolyn Devlin returns today for f/u re CAD S/P CABG, a remote hx. of inferior wall myocardial infarction, hyperlipidemia and RBBB.

## 2024-05-07 NOTE — HISTORY OF PRESENT ILLNESS
[FreeTextEntry1] : A cardiac catheterization was most recently performed last May in May 2022, after a stress test was abnormal.  This study demonstrated a patent LIMA to the OM branch and occlusion of the MARY to the LAD, although the native LAD had only mild CAD.  Occlusion of the saphenous vein graft to the RCA was also seen, but the RCA was well supplied via collaterals.  Ongoing medical therapy was advised at that time.  As she returns today, she has been well from a cardiac standpoint and continues her usual activities.  She reports no episodes of exertional chest discomfort.  The degree of mild GANDHI remains unchanged but overall does not  limit her activities; she is back to mowing her lawn now that spring has returned.  There have been no palpitations or episodes of lightheadedness/LOC. She reports no orthopnea or PND.  Medications are unchanged.   She was recently diagnosed as having Sjogren's syndrome by her rheumatologist.

## 2024-05-07 NOTE — DISCUSSION/SUMMARY
[EKG obtained to assist in diagnosis and management of assessed problem(s)] : EKG obtained to assist in diagnosis and management of assessed problem(s) [FreeTextEntry1] : EKG today shows:  Sinus Rhythm at 68 bpm.  IWMI of DIANA, RBBB; no change.  PLAN: 1.  ASCAD S/P CABG, s/p remote inferior wall myocardial infarction.  Remains active and free of ischemic sxs. - continue statin, Lovaza & metoprolol.  - continue ASA  2.  HLD - most recent lipid profile was in good range  - continue Lipitor/Lovaza  3.  RBBB - unchanged.   Given Rx to check labs prior to next O.V.  32 minutes spent on today's office visit.    She will return for a visit in 6 months.

## 2024-05-14 ENCOUNTER — OFFICE (OUTPATIENT)
Dept: URBAN - METROPOLITAN AREA CLINIC 12 | Facility: CLINIC | Age: 74
Setting detail: OPHTHALMOLOGY
End: 2024-05-14
Payer: MEDICARE

## 2024-05-14 DIAGNOSIS — H04.123: ICD-10-CM

## 2024-05-14 PROCEDURE — 99213 OFFICE O/P EST LOW 20 MIN: CPT | Performed by: OPTOMETRIST

## 2024-05-14 ASSESSMENT — CONFRONTATIONAL VISUAL FIELD TEST (CVF)
OS_FINDINGS: FULL
OD_FINDINGS: FULL

## 2024-09-17 ENCOUNTER — OFFICE (OUTPATIENT)
Dept: URBAN - METROPOLITAN AREA CLINIC 12 | Facility: CLINIC | Age: 74
Setting detail: OPHTHALMOLOGY
End: 2024-09-17
Payer: MEDICARE

## 2024-09-17 DIAGNOSIS — H04.123: ICD-10-CM

## 2024-09-17 PROCEDURE — 92083 EXTENDED VISUAL FIELD XM: CPT | Performed by: OPTOMETRIST

## 2024-09-17 PROCEDURE — 92014 COMPRE OPH EXAM EST PT 1/>: CPT | Performed by: OPTOMETRIST

## 2024-09-17 ASSESSMENT — CONFRONTATIONAL VISUAL FIELD TEST (CVF)
OS_FINDINGS: FULL
OD_FINDINGS: FULL

## 2024-11-08 ENCOUNTER — NON-APPOINTMENT (OUTPATIENT)
Age: 74
End: 2024-11-08

## 2024-11-08 ENCOUNTER — APPOINTMENT (OUTPATIENT)
Dept: CARDIOLOGY | Facility: CLINIC | Age: 74
End: 2024-11-08
Payer: MEDICARE

## 2024-11-08 VITALS
SYSTOLIC BLOOD PRESSURE: 118 MMHG | DIASTOLIC BLOOD PRESSURE: 80 MMHG | HEIGHT: 64 IN | BODY MASS INDEX: 27.31 KG/M2 | WEIGHT: 160 LBS

## 2024-11-08 DIAGNOSIS — I45.2 BIFASCICULAR BLOCK: ICD-10-CM

## 2024-11-08 DIAGNOSIS — Z95.1 PRESENCE OF AORTOCORONARY BYPASS GRAFT: ICD-10-CM

## 2024-11-08 DIAGNOSIS — E78.5 HYPERLIPIDEMIA, UNSPECIFIED: ICD-10-CM

## 2024-11-08 DIAGNOSIS — I34.0 NONRHEUMATIC MITRAL (VALVE) INSUFFICIENCY: ICD-10-CM

## 2024-11-08 PROCEDURE — 99214 OFFICE O/P EST MOD 30 MIN: CPT

## 2024-11-08 PROCEDURE — G2211 COMPLEX E/M VISIT ADD ON: CPT

## 2024-11-08 PROCEDURE — 93000 ELECTROCARDIOGRAM COMPLETE: CPT

## 2025-01-15 ENCOUNTER — OFFICE (OUTPATIENT)
Dept: URBAN - METROPOLITAN AREA CLINIC 12 | Facility: CLINIC | Age: 75
Setting detail: OPHTHALMOLOGY
End: 2025-01-15
Payer: COMMERCIAL

## 2025-01-15 DIAGNOSIS — H04.123: ICD-10-CM

## 2025-01-15 DIAGNOSIS — H35.413: ICD-10-CM

## 2025-01-15 DIAGNOSIS — H35.373: ICD-10-CM

## 2025-01-15 PROCEDURE — 92014 COMPRE OPH EXAM EST PT 1/>: CPT | Performed by: OPTOMETRIST

## 2025-01-15 PROCEDURE — 92250 FUNDUS PHOTOGRAPHY W/I&R: CPT | Performed by: OPTOMETRIST

## 2025-01-15 ASSESSMENT — REFRACTION_CURRENTRX
OD_SPHERE: +2.50
OS_OVR_VA: 20/
OS_OVR_VA: 20/
OD_OVR_VA: 20/
OS_SPHERE: +2.00
OD_VPRISM_DIRECTION: SV
OS_SPHERE: +2.50
OS_VPRISM_DIRECTION: SV
OD_OVR_VA: 20/
OD_VPRISM_DIRECTION: SV
OD_SPHERE: +2.00
OS_VPRISM_DIRECTION: SV

## 2025-01-15 ASSESSMENT — REFRACTION_AUTOREFRACTION
OS_CYLINDER: -0.75
OD_SPHERE: +0.75
OD_CYLINDER: -1.00
OS_AXIS: 062
OS_SPHERE: +0.75
OD_AXIS: 046

## 2025-01-15 ASSESSMENT — TONOMETRY
OS_IOP_MMHG: 16
OD_IOP_MMHG: 17

## 2025-01-15 ASSESSMENT — SUPERFICIAL PUNCTATE KERATITIS (SPK)
OD_SPK: 1+ 2+
OS_SPK: 2+

## 2025-01-15 ASSESSMENT — CONFRONTATIONAL VISUAL FIELD TEST (CVF)
OD_FINDINGS: FULL
OS_FINDINGS: FULL

## 2025-01-15 ASSESSMENT — KERATOMETRY
METHOD_AUTO_MANUAL: AUTO
OD_K2POWER_DIOPTERS: 44.75
OS_K2POWER_DIOPTERS: 44.00
OS_K1POWER_DIOPTERS: 43.75
OD_AXISANGLE_DEGREES: 147
OS_AXISANGLE_DEGREES: 151
OD_K1POWER_DIOPTERS: 43.75

## 2025-01-15 ASSESSMENT — REFRACTION_MANIFEST
OS_CYLINDER: -0.50
OD_VA1: 20/25
OS_AXIS: 062
OS_SPHERE: +0.25
OD_CYLINDER: -1.00
OD_SPHERE: +1.00
OS_VA1: 20/25-
OU_VA: 20/25
OD_AXIS: 062

## 2025-01-15 ASSESSMENT — TEAR BREAK UP TIME (TBUT)
OD_TBUT: 2 SEC
OS_TBUT: 2 SEC

## 2025-01-15 ASSESSMENT — VISUAL ACUITY
OS_BCVA: 20/20-
OD_BCVA: 20/20-2

## 2025-05-08 ENCOUNTER — APPOINTMENT (OUTPATIENT)
Dept: CARDIOLOGY | Facility: CLINIC | Age: 75
End: 2025-05-08
Payer: MEDICARE

## 2025-05-08 ENCOUNTER — NON-APPOINTMENT (OUTPATIENT)
Age: 75
End: 2025-05-08

## 2025-05-08 VITALS
SYSTOLIC BLOOD PRESSURE: 144 MMHG | WEIGHT: 168 LBS | DIASTOLIC BLOOD PRESSURE: 90 MMHG | HEIGHT: 64 IN | HEART RATE: 68 BPM | BODY MASS INDEX: 28.68 KG/M2

## 2025-05-08 DIAGNOSIS — I25.10 ATHEROSCLEROTIC HEART DISEASE OF NATIVE CORONARY ARTERY W/OUT ANGINA PECTORIS: ICD-10-CM

## 2025-05-08 DIAGNOSIS — R06.09 OTHER FORMS OF DYSPNEA: ICD-10-CM

## 2025-05-08 DIAGNOSIS — Z95.1 PRESENCE OF AORTOCORONARY BYPASS GRAFT: ICD-10-CM

## 2025-05-08 DIAGNOSIS — I45.2 BIFASCICULAR BLOCK: ICD-10-CM

## 2025-05-08 DIAGNOSIS — D64.9 ANEMIA, UNSPECIFIED: ICD-10-CM

## 2025-05-08 DIAGNOSIS — E78.5 HYPERLIPIDEMIA, UNSPECIFIED: ICD-10-CM

## 2025-05-08 PROCEDURE — G2211 COMPLEX E/M VISIT ADD ON: CPT

## 2025-05-08 PROCEDURE — 93000 ELECTROCARDIOGRAM COMPLETE: CPT

## 2025-05-08 PROCEDURE — 99214 OFFICE O/P EST MOD 30 MIN: CPT

## 2025-07-15 ENCOUNTER — OFFICE (OUTPATIENT)
Dept: URBAN - METROPOLITAN AREA CLINIC 12 | Facility: CLINIC | Age: 75
Setting detail: OPHTHALMOLOGY
End: 2025-07-15
Payer: COMMERCIAL

## 2025-07-15 DIAGNOSIS — H35.413: ICD-10-CM

## 2025-07-15 DIAGNOSIS — H04.123: ICD-10-CM

## 2025-07-15 DIAGNOSIS — H35.373: ICD-10-CM

## 2025-07-15 PROCEDURE — 92083 EXTENDED VISUAL FIELD XM: CPT | Performed by: OPTOMETRIST

## 2025-07-15 PROCEDURE — 92014 COMPRE OPH EXAM EST PT 1/>: CPT | Performed by: OPTOMETRIST

## 2025-07-15 PROCEDURE — 92134 CPTRZ OPH DX IMG PST SGM RTA: CPT | Performed by: OPTOMETRIST

## 2025-07-15 ASSESSMENT — CONFRONTATIONAL VISUAL FIELD TEST (CVF)
OD_FINDINGS: FULL
OS_FINDINGS: FULL

## 2025-07-15 ASSESSMENT — SUPERFICIAL PUNCTATE KERATITIS (SPK)
OD_SPK: 1+ 2+
OS_SPK: 2+

## 2025-07-15 ASSESSMENT — REFRACTION_CURRENTRX
OS_ADD: +2.50
OD_OVR_VA: 20/
OD_ADD: +2.50
OD_SPHERE: +2.00
OD_VPRISM_DIRECTION: SV
OS_VPRISM_DIRECTION: SV
OS_OVR_VA: 20/
OD_VPRISM_DIRECTION: SV
OS_SPHERE: +2.00
OS_VPRISM_DIRECTION: SV
OD_OVR_VA: 20/
OS_OVR_VA: 20/

## 2025-07-15 ASSESSMENT — REFRACTION_AUTOREFRACTION
OD_CYLINDER: -0.75
OD_SPHERE: +1.25
OD_AXIS: 076
OS_SPHERE: +0.75
OS_AXIS: 071
OS_CYLINDER: -1.00

## 2025-07-15 ASSESSMENT — VISUAL ACUITY
OS_BCVA: 20/20-1
OD_BCVA: 20/25-2

## 2025-07-15 ASSESSMENT — KERATOMETRY
OS_K2POWER_DIOPTERS: 44.25
OS_K1POWER_DIOPTERS: 44.00
METHOD_AUTO_MANUAL: AUTO
OD_AXISANGLE_DEGREES: 163
OS_AXISANGLE_DEGREES: 155
OD_K2POWER_DIOPTERS: 44.75
OD_K1POWER_DIOPTERS: 43.50

## 2025-07-15 ASSESSMENT — REFRACTION_MANIFEST
OD_CYLINDER: -1.00
OS_SPHERE: +0.25
OS_AXIS: 062
OD_VA1: 20/25
OD_SPHERE: +1.00
OS_CYLINDER: -0.50
OU_VA: 20/25
OS_VA1: 20/25-
OD_AXIS: 062

## 2025-07-15 ASSESSMENT — TONOMETRY
OD_IOP_MMHG: 14
OS_IOP_MMHG: 17

## 2025-07-15 ASSESSMENT — TEAR BREAK UP TIME (TBUT)
OD_TBUT: 2 SEC
OS_TBUT: 2 SEC

## 2025-08-29 ENCOUNTER — APPOINTMENT (OUTPATIENT)
Dept: CARDIOLOGY | Facility: CLINIC | Age: 75
End: 2025-08-29
Payer: MEDICARE

## 2025-08-29 ENCOUNTER — NON-APPOINTMENT (OUTPATIENT)
Age: 75
End: 2025-08-29

## 2025-08-29 VITALS
SYSTOLIC BLOOD PRESSURE: 123 MMHG | BODY MASS INDEX: 25.75 KG/M2 | WEIGHT: 150 LBS | OXYGEN SATURATION: 95 % | HEART RATE: 69 BPM | DIASTOLIC BLOOD PRESSURE: 70 MMHG

## 2025-08-29 DIAGNOSIS — E78.5 HYPERLIPIDEMIA, UNSPECIFIED: ICD-10-CM

## 2025-08-29 DIAGNOSIS — R06.09 OTHER FORMS OF DYSPNEA: ICD-10-CM

## 2025-08-29 DIAGNOSIS — I25.10 ATHEROSCLEROTIC HEART DISEASE OF NATIVE CORONARY ARTERY W/OUT ANGINA PECTORIS: ICD-10-CM

## 2025-08-29 DIAGNOSIS — I25.2 OLD MYOCARDIAL INFARCTION: ICD-10-CM

## 2025-08-29 PROCEDURE — G2211 COMPLEX E/M VISIT ADD ON: CPT

## 2025-08-29 PROCEDURE — 93000 ELECTROCARDIOGRAM COMPLETE: CPT

## 2025-08-29 PROCEDURE — 99215 OFFICE O/P EST HI 40 MIN: CPT

## 2025-08-29 PROCEDURE — 36415 COLL VENOUS BLD VENIPUNCTURE: CPT

## 2025-09-01 LAB
ALBUMIN SERPL ELPH-MCNC: 4.4 G/DL
ALP BLD-CCNC: 80 U/L
ALT SERPL-CCNC: 40 U/L
ANION GAP SERPL CALC-SCNC: 14 MMOL/L
AST SERPL-CCNC: 35 U/L
BILIRUB SERPL-MCNC: 0.2 MG/DL
BUN SERPL-MCNC: 16 MG/DL
CALCIUM SERPL-MCNC: 9.5 MG/DL
CHLORIDE SERPL-SCNC: 104 MMOL/L
CHOLEST SERPL-MCNC: 128 MG/DL
CO2 SERPL-SCNC: 21 MMOL/L
CREAT SERPL-MCNC: 0.71 MG/DL
EGFRCR SERPLBLD CKD-EPI 2021: 88 ML/MIN/1.73M2
FERRITIN SERPL-MCNC: 22 NG/ML
FOLATE SERPL-MCNC: >20 NG/ML
GLUCOSE SERPL-MCNC: 110 MG/DL
HCT VFR BLD CALC: 36.4 %
HDLC SERPL-MCNC: 63 MG/DL
HGB BLD-MCNC: 11.4 G/DL
IRON SATN MFR SERPL: 6 %
IRON SERPL-MCNC: 28 UG/DL
LDLC SERPL DIRECT ASSAY-MCNC: 50 MG/DL
LDLC SERPL-MCNC: 51 MG/DL
MCHC RBC-ENTMCNC: 24.7 PG
MCHC RBC-ENTMCNC: 31.3 G/DL
MCV RBC AUTO: 78.8 FL
NONHDLC SERPL-MCNC: 65 MG/DL
PLATELET # BLD AUTO: 228 K/UL
POTASSIUM SERPL-SCNC: 4 MMOL/L
PROT SERPL-MCNC: 6.5 G/DL
RBC # BLD: 4.62 M/UL
RBC # BLD: 4.62 M/UL
RBC # FLD: 16.2 %
RETICS # AUTO: 1.2 %
RETICS AGGREG/RBC NFR: 55.4 K/UL
SODIUM SERPL-SCNC: 139 MMOL/L
TIBC SERPL-MCNC: 448 UG/DL
TRIGL SERPL-MCNC: 63 MG/DL
UIBC SERPL-MCNC: 419 UG/DL
VIT B12 SERPL-MCNC: 1356 PG/ML
WBC # FLD AUTO: 5.65 K/UL